# Patient Record
Sex: FEMALE | Race: BLACK OR AFRICAN AMERICAN | NOT HISPANIC OR LATINO
[De-identification: names, ages, dates, MRNs, and addresses within clinical notes are randomized per-mention and may not be internally consistent; named-entity substitution may affect disease eponyms.]

---

## 2017-04-05 ENCOUNTER — APPOINTMENT (OUTPATIENT)
Dept: FAMILY MEDICINE | Facility: CLINIC | Age: 27
End: 2017-04-05

## 2017-04-05 VITALS
HEIGHT: 61 IN | TEMPERATURE: 98.4 F | WEIGHT: 140 LBS | DIASTOLIC BLOOD PRESSURE: 70 MMHG | RESPIRATION RATE: 16 BRPM | OXYGEN SATURATION: 99 % | BODY MASS INDEX: 26.43 KG/M2 | HEART RATE: 70 BPM | SYSTOLIC BLOOD PRESSURE: 118 MMHG

## 2017-04-05 DIAGNOSIS — J06.9 ACUTE UPPER RESPIRATORY INFECTION, UNSPECIFIED: ICD-10-CM

## 2017-04-05 RX ORDER — METRONIDAZOLE 7.5 MG/G
0.75 GEL VAGINAL
Qty: 1 | Refills: 0 | Status: DISCONTINUED | COMMUNITY
Start: 2017-01-11 | End: 2017-04-05

## 2017-08-14 ENCOUNTER — CLINICAL ADVICE (OUTPATIENT)
Age: 27
End: 2017-08-14

## 2017-08-14 DIAGNOSIS — O26.851 SPOTTING COMPLICATING PREGNANCY, FIRST TRIMESTER: ICD-10-CM

## 2017-08-14 DIAGNOSIS — Z32.01 ENCOUNTER FOR PREGNANCY TEST, RESULT POSITIVE: ICD-10-CM

## 2017-08-17 ENCOUNTER — ASOB RESULT (OUTPATIENT)
Age: 27
End: 2017-08-17

## 2017-08-17 ENCOUNTER — APPOINTMENT (OUTPATIENT)
Dept: OBGYN | Facility: CLINIC | Age: 27
End: 2017-08-17
Payer: COMMERCIAL

## 2017-08-17 ENCOUNTER — APPOINTMENT (OUTPATIENT)
Dept: OBGYN | Facility: CLINIC | Age: 27
End: 2017-08-17

## 2017-08-17 VITALS
HEIGHT: 61 IN | BODY MASS INDEX: 23.03 KG/M2 | DIASTOLIC BLOOD PRESSURE: 82 MMHG | WEIGHT: 122 LBS | SYSTOLIC BLOOD PRESSURE: 145 MMHG | HEART RATE: 80 BPM

## 2017-08-17 DIAGNOSIS — Z34.90 ENCOUNTER FOR SUPERVISION OF NORMAL PREGNANCY, UNSPECIFIED, UNSPECIFIED TRIMESTER: ICD-10-CM

## 2017-08-17 PROCEDURE — 76817 TRANSVAGINAL US OBSTETRIC: CPT

## 2017-08-17 PROCEDURE — 99213 OFFICE O/P EST LOW 20 MIN: CPT

## 2017-08-17 RX ORDER — AMOXICILLIN 500 MG/1
500 CAPSULE ORAL TWICE DAILY
Qty: 14 | Refills: 0 | Status: DISCONTINUED | COMMUNITY
Start: 2017-04-05 | End: 2017-08-17

## 2017-08-18 ENCOUNTER — APPOINTMENT (OUTPATIENT)
Dept: OBGYN | Facility: CLINIC | Age: 27
End: 2017-08-18

## 2017-08-18 LAB
ALBUMIN SERPL ELPH-MCNC: 4.3 G/DL
ALP BLD-CCNC: 85 U/L
ALT SERPL-CCNC: 8 U/L
ANION GAP SERPL CALC-SCNC: 14 MMOL/L
AST SERPL-CCNC: 12 U/L
BACTERIA UR CULT: NORMAL
BASOPHILS # BLD AUTO: 0.01 K/UL
BASOPHILS NFR BLD AUTO: 0.2 %
BILIRUB SERPL-MCNC: 0.5 MG/DL
BUN SERPL-MCNC: 8 MG/DL
C TRACH RRNA SPEC QL NAA+PROBE: NORMAL
CALCIUM SERPL-MCNC: 9.5 MG/DL
CHLORIDE SERPL-SCNC: 104 MMOL/L
CO2 SERPL-SCNC: 24 MMOL/L
CREAT SERPL-MCNC: 0.64 MG/DL
EOSINOPHIL # BLD AUTO: 0.03 K/UL
EOSINOPHIL NFR BLD AUTO: 0.5 %
GLUCOSE SERPL-MCNC: 78 MG/DL
HCG SERPL-MCNC: 719 MIU/ML
HCT VFR BLD CALC: 36.7 %
HGB BLD-MCNC: 12.4 G/DL
IMM GRANULOCYTES NFR BLD AUTO: 0.2 %
LYMPHOCYTES # BLD AUTO: 1.55 K/UL
LYMPHOCYTES NFR BLD AUTO: 24.1 %
MAN DIFF?: NORMAL
MCHC RBC-ENTMCNC: 32.5 PG
MCHC RBC-ENTMCNC: 33.8 GM/DL
MCV RBC AUTO: 96.1 FL
MONOCYTES # BLD AUTO: 0.29 K/UL
MONOCYTES NFR BLD AUTO: 4.5 %
N GONORRHOEA RRNA SPEC QL NAA+PROBE: NORMAL
NEUTROPHILS # BLD AUTO: 4.54 K/UL
NEUTROPHILS NFR BLD AUTO: 70.5 %
PLATELET # BLD AUTO: 220 K/UL
POTASSIUM SERPL-SCNC: 3.9 MMOL/L
PROT SERPL-MCNC: 7.2 G/DL
RBC # BLD: 3.82 M/UL
RBC # FLD: 12.4 %
SODIUM SERPL-SCNC: 142 MMOL/L
SOURCE AMPLIFICATION: NORMAL
WBC # FLD AUTO: 6.43 K/UL

## 2017-08-21 ENCOUNTER — CLINICAL ADVICE (OUTPATIENT)
Age: 27
End: 2017-08-21

## 2017-08-21 ENCOUNTER — EMERGENCY (EMERGENCY)
Facility: HOSPITAL | Age: 27
LOS: 1 days | Discharge: HOME | End: 2017-08-21
Attending: EMERGENCY MEDICINE | Admitting: EMERGENCY MEDICINE
Payer: COMMERCIAL

## 2017-08-21 ENCOUNTER — APPOINTMENT (OUTPATIENT)
Dept: OBGYN | Facility: CLINIC | Age: 27
End: 2017-08-21

## 2017-08-21 VITALS
TEMPERATURE: 98 F | OXYGEN SATURATION: 100 % | HEART RATE: 63 BPM | SYSTOLIC BLOOD PRESSURE: 134 MMHG | DIASTOLIC BLOOD PRESSURE: 82 MMHG | RESPIRATION RATE: 18 BRPM

## 2017-08-21 LAB
ABO + RH PNL BLD: NORMAL
BLD GP AB SCN SERPL QL: NORMAL
HCG SERPL-MCNC: 1092 MIU/ML

## 2017-08-21 PROCEDURE — 76815 OB US LIMITED FETUS(S): CPT | Mod: 26

## 2017-08-21 PROCEDURE — 99244 OFF/OP CNSLTJ NEW/EST MOD 40: CPT

## 2017-08-21 PROCEDURE — 99285 EMERGENCY DEPT VISIT HI MDM: CPT | Mod: 25

## 2017-08-21 NOTE — ED ADULT TRIAGE NOTE - CHIEF COMPLAINT QUOTE
r/o ectopic    found out was pregnant 8/9 (+ucg)...went to [facility] spotting and cramping...returned to us.... had sono and no iup but hcg 363...had a rpt sono (no iup seen) and rpt hcg done...level increased to 719,,,, uses 1-2 pads a day, has cramping....

## 2017-08-22 VITALS
SYSTOLIC BLOOD PRESSURE: 132 MMHG | RESPIRATION RATE: 14 BRPM | HEART RATE: 60 BPM | DIASTOLIC BLOOD PRESSURE: 62 MMHG | OXYGEN SATURATION: 97 % | TEMPERATURE: 98 F

## 2017-08-22 DIAGNOSIS — O00.90 UNSPECIFIED ECTOPIC PREGNANCY WITHOUT INTRAUTERINE PREGNANCY: ICD-10-CM

## 2017-08-22 LAB
ALBUMIN SERPL ELPH-MCNC: 4.2 G/DL — SIGNIFICANT CHANGE UP (ref 3.3–5)
ALP SERPL-CCNC: 71 U/L — SIGNIFICANT CHANGE UP (ref 40–120)
ALT FLD-CCNC: 8 U/L — SIGNIFICANT CHANGE UP (ref 4–33)
APPEARANCE UR: SIGNIFICANT CHANGE UP
AST SERPL-CCNC: 13 U/L — SIGNIFICANT CHANGE UP (ref 4–32)
BASOPHILS # BLD AUTO: 0.02 K/UL — SIGNIFICANT CHANGE UP (ref 0–0.2)
BASOPHILS NFR BLD AUTO: 0.3 % — SIGNIFICANT CHANGE UP (ref 0–2)
BILIRUB SERPL-MCNC: 0.5 MG/DL — SIGNIFICANT CHANGE UP (ref 0.2–1.2)
BILIRUB UR-MCNC: NEGATIVE — SIGNIFICANT CHANGE UP
BLD GP AB SCN SERPL QL: NEGATIVE — SIGNIFICANT CHANGE UP
BLOOD UR QL VISUAL: HIGH
BUN SERPL-MCNC: 8 MG/DL — SIGNIFICANT CHANGE UP (ref 7–23)
CALCIUM SERPL-MCNC: 9.1 MG/DL — SIGNIFICANT CHANGE UP (ref 8.4–10.5)
CHLORIDE SERPL-SCNC: 106 MMOL/L — SIGNIFICANT CHANGE UP (ref 98–107)
CO2 SERPL-SCNC: 23 MMOL/L — SIGNIFICANT CHANGE UP (ref 22–31)
COLOR SPEC: YELLOW — SIGNIFICANT CHANGE UP
CREAT SERPL-MCNC: 0.56 MG/DL — SIGNIFICANT CHANGE UP (ref 0.5–1.3)
EOSINOPHIL # BLD AUTO: 0.04 K/UL — SIGNIFICANT CHANGE UP (ref 0–0.5)
EOSINOPHIL NFR BLD AUTO: 0.6 % — SIGNIFICANT CHANGE UP (ref 0–6)
GLUCOSE SERPL-MCNC: 84 MG/DL — SIGNIFICANT CHANGE UP (ref 70–99)
GLUCOSE UR-MCNC: NEGATIVE — SIGNIFICANT CHANGE UP
HCG SERPL-ACNC: 1376 MIU/ML — SIGNIFICANT CHANGE UP
HCT VFR BLD CALC: 31.5 % — LOW (ref 34.5–45)
HGB BLD-MCNC: 10.9 G/DL — LOW (ref 11.5–15.5)
IMM GRANULOCYTES # BLD AUTO: 0.01 # — SIGNIFICANT CHANGE UP
IMM GRANULOCYTES NFR BLD AUTO: 0.1 % — SIGNIFICANT CHANGE UP (ref 0–1.5)
KETONES UR-MCNC: NEGATIVE — SIGNIFICANT CHANGE UP
LEUKOCYTE ESTERASE UR-ACNC: NEGATIVE — SIGNIFICANT CHANGE UP
LYMPHOCYTES # BLD AUTO: 2.31 K/UL — SIGNIFICANT CHANGE UP (ref 1–3.3)
LYMPHOCYTES # BLD AUTO: 32.8 % — SIGNIFICANT CHANGE UP (ref 13–44)
MCHC RBC-ENTMCNC: 33 PG — SIGNIFICANT CHANGE UP (ref 27–34)
MCHC RBC-ENTMCNC: 34.6 % — SIGNIFICANT CHANGE UP (ref 32–36)
MCV RBC AUTO: 95.5 FL — SIGNIFICANT CHANGE UP (ref 80–100)
MONOCYTES # BLD AUTO: 0.34 K/UL — SIGNIFICANT CHANGE UP (ref 0–0.9)
MONOCYTES NFR BLD AUTO: 4.8 % — SIGNIFICANT CHANGE UP (ref 2–14)
MUCOUS THREADS # UR AUTO: SIGNIFICANT CHANGE UP
NEUTROPHILS # BLD AUTO: 4.33 K/UL — SIGNIFICANT CHANGE UP (ref 1.8–7.4)
NEUTROPHILS NFR BLD AUTO: 61.4 % — SIGNIFICANT CHANGE UP (ref 43–77)
NITRITE UR-MCNC: NEGATIVE — SIGNIFICANT CHANGE UP
NRBC # FLD: 0 — SIGNIFICANT CHANGE UP
PH UR: 5.5 — SIGNIFICANT CHANGE UP (ref 4.6–8)
PLATELET # BLD AUTO: 188 K/UL — SIGNIFICANT CHANGE UP (ref 150–400)
PMV BLD: 11 FL — SIGNIFICANT CHANGE UP (ref 7–13)
POTASSIUM SERPL-MCNC: 3.7 MMOL/L — SIGNIFICANT CHANGE UP (ref 3.5–5.3)
POTASSIUM SERPL-SCNC: 3.7 MMOL/L — SIGNIFICANT CHANGE UP (ref 3.5–5.3)
PROT SERPL-MCNC: 6.4 G/DL — SIGNIFICANT CHANGE UP (ref 6–8.3)
PROT UR-MCNC: 20 — SIGNIFICANT CHANGE UP
RBC # BLD: 3.3 M/UL — LOW (ref 3.8–5.2)
RBC # FLD: 11.7 % — SIGNIFICANT CHANGE UP (ref 10.3–14.5)
RBC CASTS # UR COMP ASSIST: HIGH (ref 0–?)
RH IG SCN BLD-IMP: POSITIVE — SIGNIFICANT CHANGE UP
SODIUM SERPL-SCNC: 144 MMOL/L — SIGNIFICANT CHANGE UP (ref 135–145)
SP GR SPEC: 1.02 — SIGNIFICANT CHANGE UP (ref 1–1.03)
SQUAMOUS # UR AUTO: SIGNIFICANT CHANGE UP
UROBILINOGEN FLD QL: NORMAL E.U. — SIGNIFICANT CHANGE UP (ref 0.1–0.2)
WBC # BLD: 7.05 K/UL — SIGNIFICANT CHANGE UP (ref 3.8–10.5)
WBC # FLD AUTO: 7.05 K/UL — SIGNIFICANT CHANGE UP (ref 3.8–10.5)
WBC UR QL: SIGNIFICANT CHANGE UP (ref 0–?)

## 2017-08-22 PROCEDURE — 76830 TRANSVAGINAL US NON-OB: CPT | Mod: 26

## 2017-08-22 RX ORDER — FENTANYL CITRATE 50 UG/ML
50 INJECTION INTRAVENOUS ONCE
Qty: 0 | Refills: 0 | Status: DISCONTINUED | OUTPATIENT
Start: 2017-08-22 | End: 2017-08-22

## 2017-08-22 RX ORDER — ONDANSETRON 8 MG/1
4 TABLET, FILM COATED ORAL ONCE
Qty: 0 | Refills: 0 | Status: DISCONTINUED | OUTPATIENT
Start: 2017-08-22 | End: 2017-08-22

## 2017-08-22 RX ORDER — METHOTREXATE 2.5 MG/1
76.5 TABLET ORAL ONCE
Qty: 0 | Refills: 0 | Status: COMPLETED | OUTPATIENT
Start: 2017-08-22 | End: 2017-08-22

## 2017-08-22 RX ORDER — SODIUM CHLORIDE 9 MG/ML
1000 INJECTION INTRAMUSCULAR; INTRAVENOUS; SUBCUTANEOUS ONCE
Qty: 0 | Refills: 0 | Status: COMPLETED | OUTPATIENT
Start: 2017-08-22 | End: 2017-08-22

## 2017-08-22 RX ADMIN — SODIUM CHLORIDE 1000 MILLILITER(S): 9 INJECTION INTRAMUSCULAR; INTRAVENOUS; SUBCUTANEOUS at 02:39

## 2017-08-22 RX ADMIN — METHOTREXATE 76.5 MILLIGRAM(S): 2.5 TABLET ORAL at 07:03

## 2017-08-22 NOTE — ED PROVIDER NOTE - CARE PLAN
Principal Discharge DX:	Ectopic pregnancy  Goal:	Follow up with OB/GYN  Instructions for follow-up, activity and diet:	Please follow up with your OB/GYN. If you experience worsening pain or bleeding, please seek medical attention immediately.

## 2017-08-22 NOTE — ED PROVIDER NOTE - CHIEF COMPLAINT
The patient is a 27y Female complaining of The patient is a 27y Female complaining of abdominal cramping and vaginal bleeding

## 2017-08-22 NOTE — ED PROVIDER NOTE - MEDICAL DECISION MAKING DETAILS
Patient is a 26 yo F w/ first pregnancy with cramping and vaginal bleeding x2 weeks. HCG trending upward and no IUP seen on prior TVUS. Concern for ectopic pregnancy. Will check UA, CBC, coags, type and screen, TVUS. Patient is a 26 yo F w/ first pregnancy with cramping and vaginal bleeding x2 weeks. HCG trending upward and no IUP seen on prior TVUS. Concern for ectopic pregnancy. Will check UA, CBC, coags, type and screen, TVUS.    TVUS suggestive of ectopic pregnancy. GYN will administer MTX and then DC home - Fady Hou PGY 3

## 2017-08-22 NOTE — CONSULT NOTE ADULT - SUBJECTIVE AND OBJECTIVE BOX
HPI:    27y  @ 7 weeks 0/7days by LMP 2017  presents with increased vaginal bleeding. Patient has been followed by Dr. Leonard to trend beta HCG secondary to vaginal bleeding and abdominal pain. Patient reports that her abdominal pain has resolved. Denies chest pain, SOB, N/V, leg pain.    Betas:       OBHx:topx3  GynHx: regular menses, hx of fibroid. Hx of chlamydia infection  PMH:denies  PSH:D&Cx1  All: shrimp, NKDA  Meds: none      Vital Signs Last 24 Hrs  T(C): 36.9 (22 Aug 2017 01:00), Max: 36.9 (21 Aug 2017 23:14)  T(F): 98.4 (22 Aug 2017 01:00), Max: 98.4 (21 Aug 2017 23:14)  HR: 71 (22 Aug 2017 02:59) (60 - 71)  BP: 128/70 (22 Aug 2017 02:59) (99/53 - 134/82)  BP(mean): --  RR: 15 (22 Aug 2017 02:59) (14 - 18)  SpO2: 98% (22 Aug 2017 02:59) (98% - 100%)    PE:  Gen: Comfortable, NAD  CV: RRR  Pulm: CTAB  Abd: Soft, NT nondistended  Ext: No edema or tenderness bilaterally  Spec Exam: small amount of dark red blood in the vagina. No active bleeding. No cervical or vaginal lesions.     LABS:                        10.9   7.05  )-----------( 188      ( 22 Aug 2017 00:50 )             31.5         144  |  106  |  8   ----------------------------<  84  3.7   |  23  |  0.56    Ca    9.1      22 Aug 2017 00:50    TPro  6.4  /  Alb  4.2  /  TBili  0.5  /  DBili  x   /  AST  13  /  ALT  8   /  AlkPhos  71  08-22      Urinalysis Basic - ( 22 Aug 2017 03:00 )    Color: YELLOW / Appearance: HAZY / S.024 / pH: 5.5  Gluc: NEGATIVE / Ketone: NEGATIVE  / Bili: NEGATIVE / Urobili: NORMAL E.U.   Blood: MODERATE / Protein: 20 / Nitrite: NEGATIVE   Leuk Esterase: NEGATIVE / RBC: 5-10 / WBC 2-5   Sq Epi: MOD / Non Sq Epi: x / Bacteria: x        RADIOLOGY & ADDITIONAL STUDIES:      EXAM:  US TRANSVAGINAL        PROCEDURE DATE:  Aug 22 2017         INTERPRETATION:  CLINICAL INFORMATION: Vaginal bleeding for 9 days. Serum   hCG is 1376    LMP: 2017  Estimated Gestational Age by LMP: 7 weeks 0 days.    COMPARISON:None available.    TECHNIQUE: Transabominal pelvic sonogram.     FINDINGS:    Uterus: There is a fundal fibroid measuring 4.7 x 5.2 x 5.9 cm. The   endometrium is thickened measuring up to 10 mm. No gestational sac is   demonstrated.     Right ovary: 4.1 x 2.4 x 4.0 cm. Within normal limits.  Left ovary: 3.4 x 1.8 x 3.0 cm with a thick-walled paraovarian cystic   lesion measuring 1.1 x 0.9 cm.  Free fluid: None.    IMPRESSION:    Thick-walled cystic lesion in the left paraovarian region which given the   lack of an intrauterine gestation is suspicious for ectopic pregnancy   unless proven otherwise. Correlate with serial beta hCGs and follow-up   pelvic ultrasound as clinically warranted. HPI:    27y  @ 7 weeks 0/7days by LMP 2017  presents with increased vaginal bleeding. Patient has been followed by Dr. Leonard to trend beta HCG secondary to vaginal bleeding and abdominal pain. Patient reports that her abdominal pain has resolved. Denies chest pain, SOB, N/V, leg pain.        OBHx:topx3  GynHx: regular menses, hx of fibroid. Hx of chlamydia infection  PMH:denies  PSH:D&Cx1  All: shrimp, NKDA  Meds: none      Vital Signs Last 24 Hrs  T(C): 36.9 (22 Aug 2017 01:00), Max: 36.9 (21 Aug 2017 23:14)  T(F): 98.4 (22 Aug 2017 01:00), Max: 98.4 (21 Aug 2017 23:14)  HR: 71 (22 Aug 2017 02:59) (60 - 71)  BP: 128/70 (22 Aug 2017 02:59) (99/53 - 134/82)  BP(mean): --  RR: 15 (22 Aug 2017 02:59) (14 - 18)  SpO2: 98% (22 Aug 2017 02:59) (98% - 100%)    PE:  Gen: Comfortable, NAD  CV: RRR  Pulm: CTAB  Abd: Soft, NT nondistended  Ext: No edema or tenderness bilaterally  Spec Exam: small amount of dark red blood in the vagina. No active bleeding. No cervical or vaginal lesions.     LABS:                        10.9   7.05  )-----------( 188      ( 22 Aug 2017 00:50 )             31.5         144  |  106  |  8   ----------------------------<  84  3.7   |  23  |  0.56    Ca    9.1      22 Aug 2017 00:50    TPro  6.4  /  Alb  4.2  /  TBili  0.5  /  DBili  x   /  AST  13  /  ALT  8   /  AlkPhos  71  08-22      Urinalysis Basic - ( 22 Aug 2017 03:00 )    Color: YELLOW / Appearance: HAZY / S.024 / pH: 5.5  Gluc: NEGATIVE / Ketone: NEGATIVE  / Bili: NEGATIVE / Urobili: NORMAL E.U.   Blood: MODERATE / Protein: 20 / Nitrite: NEGATIVE   Leuk Esterase: NEGATIVE / RBC: 5-10 / WBC 2-5   Sq Epi: MOD / Non Sq Epi: x / Bacteria: x        RADIOLOGY & ADDITIONAL STUDIES:      EXAM:  US TRANSVAGINAL        PROCEDURE DATE:  Aug 22 2017         INTERPRETATION:  CLINICAL INFORMATION: Vaginal bleeding for 9 days. Serum   hCG is 1376    LMP: 2017  Estimated Gestational Age by LMP: 7 weeks 0 days.    COMPARISON:None available.    TECHNIQUE: Transabominal pelvic sonogram.     FINDINGS:    Uterus: There is a fundal fibroid measuring 4.7 x 5.2 x 5.9 cm. The   endometrium is thickened measuring up to 10 mm. No gestational sac is   demonstrated.     Right ovary: 4.1 x 2.4 x 4.0 cm. Within normal limits.  Left ovary: 3.4 x 1.8 x 3.0 cm with a thick-walled paraovarian cystic   lesion measuring 1.1 x 0.9 cm.  Free fluid: None.    IMPRESSION:    Thick-walled cystic lesion in the left paraovarian region which given the   lack of an intrauterine gestation is suspicious for ectopic pregnancy   unless proven otherwise. Correlate with serial beta hCGs and follow-up   pelvic ultrasound as clinically warranted.

## 2017-08-22 NOTE — ED PROVIDER NOTE - PLAN OF CARE
Follow up with OB/GYN Please follow up with your OB/GYN. If you experience worsening pain or bleeding, please seek medical attention immediately.

## 2017-08-22 NOTE — ED PROVIDER NOTE - ATTENDING CONTRIBUTION TO CARE
I was physically present for the E/M service provided. I agree with above history, physical, and plan which I have reviewed and edited where appropriate. I was physically present for the key portions of the service provided.    27F  LNMP . Positive home pregnancy test . Vaginal bleeding 1 pad per day. Has followed with Gyn.  last US, no IUP, fibroids. Up-trending beta-hcg. LLQ pain is improving. Sent by Gyn to r/o ectopic.  -NAD, abdomen soft NTND  -TV US r/o ectopic  -Beta-Hcg + T&S check  -UA r/o UTI

## 2017-08-22 NOTE — ED ADULT NURSE NOTE - OBJECTIVE STATEMENT
Pt received to room 17 A&Ox3 c/o abdominal pain with vaginal spotting x a week. Reports + hcg that have been increasing. Sent to ED by OBGYN to r/o ectopic pregnancy. IV accessed, labs sent, vital signs noted. Will continue monitor.

## 2017-08-22 NOTE — CONSULT NOTE ADULT - ATTENDING COMMENTS
High suspicion for left ectopic pregnancy.  No contraindications for MTX.  R/A/B of MTX d/w pt and her family.  MTX ordered

## 2017-08-22 NOTE — CONSULT NOTE ADULT - ASSESSMENT
27y  @ 7 weeks 0/7days by LMP 2017  presents with increased vaginal bleeding and cystic structure next to the left ovary suspicious for an ectopic pregnancy

## 2017-08-22 NOTE — CONSULT NOTE ADULT - PROBLEM SELECTOR RECOMMENDATION 9
- counseled patient on medical and surgical management   - patient to receive methotrexate  - tbs with Dr. Leonard    d/w Dr. Horacio Choudhury PGY-2 - counseled patient on medical and surgical management   - patient to receive methotrexate 76.5 MG IM once  - f/u [facility] friday 8/25 and monday 8/28 with Dr. Leonard    Patient seen with Dr. Horacio Choudhury PGY-2

## 2017-08-22 NOTE — ED PROVIDER NOTE - OBJECTIVE STATEMENT
Patient is a 28 yo  F w/ no PMH p/w abdominal cramping and vaginal bleeding x2 weeks. Patient found out she was pregnant with positive urine HCG on . Since then, patient has had lower abdominal cramping and vaginal spotting. Cramping initially 8/10 now 6/10. Vaginal spotting progressed to heavier bleeding. Serum HCG trending upwards in PMD/outpatient GYN office, 363 to 719 to 1092. Patient had TVUS last  which showed fibroid, no evidence for IUP and ? finding in L fallopian tube. Also endorses increased urinary frequency and nausea.. Denies any fevers, chills, vomiting.

## 2017-08-22 NOTE — ED PROVIDER NOTE - PROGRESS NOTE DETAILS
Huber COSTA: received signout on patient. Pt received MTX for ectopic pregnancy. Seen by OB here in ED. Has follow up appointment for Friday. On reassessment pt feeling okay 30 mins s/p MTX. Repeat vitals reassuring. Will dc home with close follow up and return precautions.

## 2017-08-22 NOTE — ED PROCEDURE NOTE - PROCEDURE ADDITIONAL DETAILS
focused Ed ultrasound transabdominal OB to evaluate fetal well being.   uterus scanned in two planes in gray scale and m mode  No IUP in uterus  No free fluid    Impression: No IUP. No free fluid in pelvis.  ELIAS  [id number]

## 2017-08-24 ENCOUNTER — APPOINTMENT (OUTPATIENT)
Dept: OBGYN | Facility: CLINIC | Age: 27
End: 2017-08-24

## 2017-08-25 ENCOUNTER — APPOINTMENT (OUTPATIENT)
Dept: OBGYN | Facility: CLINIC | Age: 27
End: 2017-08-25

## 2017-08-25 LAB — HCG SERPL-MCNC: 2427 MIU/ML

## 2017-08-28 ENCOUNTER — APPOINTMENT (OUTPATIENT)
Dept: OBGYN | Facility: CLINIC | Age: 27
End: 2017-08-28

## 2017-08-28 ENCOUNTER — OTHER (OUTPATIENT)
Age: 27
End: 2017-08-28

## 2017-08-28 ENCOUNTER — EMERGENCY (EMERGENCY)
Facility: HOSPITAL | Age: 27
LOS: 1 days | Discharge: HOME | End: 2017-08-28
Admitting: EMERGENCY MEDICINE
Payer: COMMERCIAL

## 2017-08-28 VITALS
DIASTOLIC BLOOD PRESSURE: 56 MMHG | SYSTOLIC BLOOD PRESSURE: 125 MMHG | RESPIRATION RATE: 16 BRPM | HEART RATE: 59 BPM | OXYGEN SATURATION: 100 %

## 2017-08-28 VITALS
OXYGEN SATURATION: 100 % | DIASTOLIC BLOOD PRESSURE: 59 MMHG | RESPIRATION RATE: 18 BRPM | TEMPERATURE: 98 F | HEART RATE: 76 BPM | SYSTOLIC BLOOD PRESSURE: 119 MMHG

## 2017-08-28 DIAGNOSIS — O00.10 TUBAL PREGNANCY WITHOUT INTRAUTERINE PREGNANCY: ICD-10-CM

## 2017-08-28 LAB
ALBUMIN SERPL ELPH-MCNC: 4 G/DL — SIGNIFICANT CHANGE UP (ref 3.3–5)
ALP SERPL-CCNC: 69 U/L — SIGNIFICANT CHANGE UP (ref 40–120)
ALT FLD-CCNC: 15 U/L — SIGNIFICANT CHANGE UP (ref 4–33)
APPEARANCE UR: CLEAR — SIGNIFICANT CHANGE UP
AST SERPL-CCNC: 16 U/L — SIGNIFICANT CHANGE UP (ref 4–32)
BASOPHILS # BLD AUTO: 0.01 K/UL — SIGNIFICANT CHANGE UP (ref 0–0.2)
BASOPHILS NFR BLD AUTO: 0.2 % — SIGNIFICANT CHANGE UP (ref 0–2)
BILIRUB SERPL-MCNC: 0.3 MG/DL — SIGNIFICANT CHANGE UP (ref 0.2–1.2)
BILIRUB UR-MCNC: NEGATIVE — SIGNIFICANT CHANGE UP
BLD GP AB SCN SERPL QL: NEGATIVE — SIGNIFICANT CHANGE UP
BLOOD UR QL VISUAL: NEGATIVE — SIGNIFICANT CHANGE UP
BUN SERPL-MCNC: 7 MG/DL — SIGNIFICANT CHANGE UP (ref 7–23)
CALCIUM SERPL-MCNC: 8.9 MG/DL — SIGNIFICANT CHANGE UP (ref 8.4–10.5)
CHLORIDE SERPL-SCNC: 104 MMOL/L — SIGNIFICANT CHANGE UP (ref 98–107)
CO2 SERPL-SCNC: 26 MMOL/L — SIGNIFICANT CHANGE UP (ref 22–31)
COLOR SPEC: YELLOW — SIGNIFICANT CHANGE UP
CREAT SERPL-MCNC: 0.58 MG/DL — SIGNIFICANT CHANGE UP (ref 0.5–1.3)
EOSINOPHIL # BLD AUTO: 0.05 K/UL — SIGNIFICANT CHANGE UP (ref 0–0.5)
EOSINOPHIL NFR BLD AUTO: 1.1 % — SIGNIFICANT CHANGE UP (ref 0–6)
GLUCOSE SERPL-MCNC: 85 MG/DL — SIGNIFICANT CHANGE UP (ref 70–99)
GLUCOSE UR-MCNC: NEGATIVE — SIGNIFICANT CHANGE UP
HCT VFR BLD CALC: 31.2 % — LOW (ref 34.5–45)
HGB BLD-MCNC: 10.6 G/DL — LOW (ref 11.5–15.5)
IMM GRANULOCYTES # BLD AUTO: 0.03 # — SIGNIFICANT CHANGE UP
IMM GRANULOCYTES NFR BLD AUTO: 0.6 % — SIGNIFICANT CHANGE UP (ref 0–1.5)
KETONES UR-MCNC: NEGATIVE — SIGNIFICANT CHANGE UP
LEUKOCYTE ESTERASE UR-ACNC: NEGATIVE — SIGNIFICANT CHANGE UP
LYMPHOCYTES # BLD AUTO: 1.4 K/UL — SIGNIFICANT CHANGE UP (ref 1–3.3)
LYMPHOCYTES # BLD AUTO: 29.9 % — SIGNIFICANT CHANGE UP (ref 13–44)
MCHC RBC-ENTMCNC: 32.6 PG — SIGNIFICANT CHANGE UP (ref 27–34)
MCHC RBC-ENTMCNC: 34 % — SIGNIFICANT CHANGE UP (ref 32–36)
MCV RBC AUTO: 96 FL — SIGNIFICANT CHANGE UP (ref 80–100)
MONOCYTES # BLD AUTO: 0.27 K/UL — SIGNIFICANT CHANGE UP (ref 0–0.9)
MONOCYTES NFR BLD AUTO: 5.8 % — SIGNIFICANT CHANGE UP (ref 2–14)
MUCOUS THREADS # UR AUTO: SIGNIFICANT CHANGE UP
NEUTROPHILS # BLD AUTO: 2.92 K/UL — SIGNIFICANT CHANGE UP (ref 1.8–7.4)
NEUTROPHILS NFR BLD AUTO: 62.4 % — SIGNIFICANT CHANGE UP (ref 43–77)
NITRITE UR-MCNC: NEGATIVE — SIGNIFICANT CHANGE UP
NRBC # FLD: 0 — SIGNIFICANT CHANGE UP
PH UR: 8.5 — HIGH (ref 4.6–8)
PLATELET # BLD AUTO: 148 K/UL — LOW (ref 150–400)
PMV BLD: 10.8 FL — SIGNIFICANT CHANGE UP (ref 7–13)
POTASSIUM SERPL-MCNC: 4.2 MMOL/L — SIGNIFICANT CHANGE UP (ref 3.5–5.3)
POTASSIUM SERPL-SCNC: 4.2 MMOL/L — SIGNIFICANT CHANGE UP (ref 3.5–5.3)
PROT SERPL-MCNC: 6.6 G/DL — SIGNIFICANT CHANGE UP (ref 6–8.3)
PROT UR-MCNC: 10 — SIGNIFICANT CHANGE UP
RBC # BLD: 3.25 M/UL — LOW (ref 3.8–5.2)
RBC # FLD: 11.2 % — SIGNIFICANT CHANGE UP (ref 10.3–14.5)
RBC CASTS # UR COMP ASSIST: SIGNIFICANT CHANGE UP (ref 0–?)
RH IG SCN BLD-IMP: POSITIVE — SIGNIFICANT CHANGE UP
SODIUM SERPL-SCNC: 141 MMOL/L — SIGNIFICANT CHANGE UP (ref 135–145)
SP GR SPEC: 1.01 — SIGNIFICANT CHANGE UP (ref 1–1.03)
SQUAMOUS # UR AUTO: SIGNIFICANT CHANGE UP
UROBILINOGEN FLD QL: NORMAL E.U. — SIGNIFICANT CHANGE UP (ref 0.1–0.2)
WBC # BLD: 4.68 K/UL — SIGNIFICANT CHANGE UP (ref 3.8–10.5)
WBC # FLD AUTO: 4.68 K/UL — SIGNIFICANT CHANGE UP (ref 3.8–10.5)
WBC UR QL: SIGNIFICANT CHANGE UP (ref 0–?)

## 2017-08-28 PROCEDURE — 99285 EMERGENCY DEPT VISIT HI MDM: CPT

## 2017-08-28 PROCEDURE — 76830 TRANSVAGINAL US NON-OB: CPT | Mod: 26

## 2017-08-28 RX ORDER — METHOTREXATE 2.5 MG/1
80 TABLET ORAL ONCE
Qty: 0 | Refills: 0 | Status: COMPLETED | OUTPATIENT
Start: 2017-08-28 | End: 2017-08-28

## 2017-08-28 RX ORDER — SODIUM CHLORIDE 9 MG/ML
1000 INJECTION INTRAMUSCULAR; INTRAVENOUS; SUBCUTANEOUS ONCE
Qty: 0 | Refills: 0 | Status: COMPLETED | OUTPATIENT
Start: 2017-08-28 | End: 2017-08-28

## 2017-08-28 RX ADMIN — SODIUM CHLORIDE 1000 MILLILITER(S): 9 INJECTION INTRAMUSCULAR; INTRAVENOUS; SUBCUTANEOUS at 16:11

## 2017-08-28 RX ADMIN — METHOTREXATE 80 MILLIGRAM(S): 2.5 TABLET ORAL at 20:06

## 2017-08-28 NOTE — ED PROVIDER NOTE - OBJECTIVE STATEMENT
28 yo female  c known left ectopic pregnancy s/p methotrexate on 17 sent in by Dr. Leonard for elevated HCG levels today. Pt states does not have any bleeding or cramping at present.  As per patient Dr. Leonard wanted her to get a repeat US and methotrexate dose.

## 2017-08-28 NOTE — ED ADULT NURSE NOTE - OBJECTIVE STATEMENT
26 yo female  c known left ectopic pregnancy s/p methotrexate on 17 sent in by Dr. Leonard for elevated HCG levels today. Pt states does not have any bleeding or cramping at present.  As per patient Dr. Leonard wanted her to get a repeat US and methotrexate dose.

## 2017-08-28 NOTE — ED PROVIDER NOTE - PROGRESS NOTE DETAILS
Pt evaluated by GYN- US revealed unchanged ectopic pregnancy from last week. Received MTX, will dc to follow up with Dr. Leonard on 08/31 for repeat beta levels.

## 2017-08-28 NOTE — CONSULT NOTE ADULT - ATTENDING COMMENTS
Patient was seen and examined by me. Agree with resident assessment and plan. Ectopic pregnancy, s/p 1 dose of MTX without drop in bhCG. As patient is stable and asymptomatic, without concern for rupture of ectopic pregnancy, would recommend second dose of methotrexate. Patient was counseled regarding risks, benefits, and alternatives. All questions answered.

## 2017-08-28 NOTE — CONSULT NOTE ADULT - PROBLEM SELECTOR RECOMMENDATION 9
- 2nd dose of MTX to be given today given pt's stable condition and absence of pain  - Pt will follow up on 8/31 and 9/3 for day 4 and day 7 [facility] test    d/w Vanessa Hernandez, Horacio, & Fran

## 2017-08-28 NOTE — CONSULT NOTE ADULT - ASSESSMENT
27y  LMP 2017 with left ectopic pregnancy s/p MTX on  w/ bHCG values that did not decrease appropriately, otherwise stable with no change in ectopic appearance.

## 2017-08-28 NOTE — ED PROVIDER NOTE - CARE PLAN
Principal Discharge DX:	Tubal pregnancy without intrauterine pregnancy  Instructions for follow-up, activity and diet:	Follow up with Dr. Leonard on 08/31 for repeat beta check. Drink plenty of fluids. Return to ED immediately if you have any worsening pain or bleeding.

## 2017-08-28 NOTE — ED PROVIDER NOTE - MEDICAL DECISION MAKING DETAILS
28 yo female c known left ectopic pregnancy s/p methotrexate on 08/21 sent in for rising beta level.  Sent by Dr. mathews for repeat US and methotrexate. Pt has no pain or bleeding, in NAD. Will call GYN order labs and US.

## 2017-08-28 NOTE — CONSULT NOTE ADULT - SUBJECTIVE AND OBJECTIVE BOX
27y  LMP 2017 presents to ED for f/u of ectopic pregnancy. Patient was originally being followed by Dr. Leonard to trend beta HCG secondary to vaginal bleeding and abdominal pain, but her symptoms have since resolved. She received MTX on  for an ectopic pregnancy seen on the left. Her bHCG since trended from 1376 () to 2427 () to 2614 () (Values can be found in [facility] tab of sunrise). Pt denies chest pain, SOB, N/V, fevers, and chills  OBHx:topx3  GynHx: regular menses, hx of fibroid. Hx of chlamydia infection  PMH: denies  PSH: D&Cx1  All: shrimp, NKDA  Meds: none    VS  T(C): 36.8 (17 @ 15:18)  HR: 76 (17 @ 15:18)  BP: 119/59 (17 @ 15:18)  RR: 18 (17 @ 15:18)  SpO2: 100% (17 @ 15:18)    Physical Exam:  General: NAD  Abdomen: Soft, non-tender, non-distended  Pelvic: Labia wnl w/o lesions or erythema, no discharge or bleeding in vaginal vault, no cervical discharge or erythema, no CMT, uterus not enlarged, adnexa w/o masses and tenderness               10.6   4.68  )-----------( 148      (  @ 16:10 )             31.2      @ 16:10    141  |  104  |  7   ----------------------------<  85  4.2   |  26  |  0.58    Ca    8.9       @ 16:10    TPro  6.6  /  Alb  4.0  /  TBili  0.3  /  DBili  x   /  AST  16  /  ALT  15  /  AlkPhos  69   @ 16:10    Blood Type: AB Positive  Antibody Screen: Negative    < from: US Transvaginal (17 @ 16:49) >  EXAM:  US TRANSVAGINAL        PROCEDURE DATE:  Aug 28 2017         INTERPRETATION:  CLINICAL INFORMATION:        LMP:        COMPARISON: Ultrasound 2017    TECHNIQUE:     Transvaginal pelvic sonogram.            FINDINGS:    Uterus: No intrauterine gestation. Posterior subserosal leiomyoma 4.5 cm.   Endometrium: 3 mm. Within normal limits.    Right ovary: Within normal limits.    Left ovary: 1.4 x 1.1 x 1 cm ectopic gestational sac essentially   unchanged. Corpus luteum again visualized.    Fluid: None.    IMPRESSION:    Left ectopic gestation essentially unchanged from 2017    < end of copied text >

## 2017-08-28 NOTE — ED ADULT TRIAGE NOTE - CHIEF COMPLAINT QUOTE
Pt was treated for ectopic pregnancy las monday. Pt was given methotrexate but states has cramping and HCG levels still elevated. Pt sent in by gyn.

## 2017-08-29 DIAGNOSIS — O00.90 UNSPECIFIED. ECTOPIC. PREGNANCY WITHOUT INTRAUTERINE PREGNANCY: ICD-10-CM

## 2017-08-29 LAB — HCG SERPL-MCNC: 2614 MIU/ML

## 2017-08-31 ENCOUNTER — APPOINTMENT (OUTPATIENT)
Dept: OBGYN | Facility: CLINIC | Age: 27
End: 2017-08-31

## 2017-08-31 ENCOUNTER — OTHER (OUTPATIENT)
Age: 27
End: 2017-08-31

## 2017-08-31 LAB — HCG SERPL-MCNC: 2307 MIU/ML

## 2017-09-04 LAB — HCG SERPL-MCNC: 1735 MIU/ML

## 2017-09-13 ENCOUNTER — APPOINTMENT (OUTPATIENT)
Dept: OBGYN | Facility: CLINIC | Age: 27
End: 2017-09-13

## 2017-09-26 LAB — HCG SERPL-MCNC: 874 MIU/ML

## 2017-09-29 LAB — HCG SERPL-MCNC: 2 MIU/ML

## 2018-03-01 ENCOUNTER — APPOINTMENT (OUTPATIENT)
Dept: OBGYN | Facility: CLINIC | Age: 28
End: 2018-03-01

## 2018-03-02 ENCOUNTER — LABORATORY RESULT (OUTPATIENT)
Age: 28
End: 2018-03-02

## 2018-03-02 ENCOUNTER — APPOINTMENT (OUTPATIENT)
Dept: OBGYN | Facility: CLINIC | Age: 28
End: 2018-03-02
Payer: COMMERCIAL

## 2018-03-02 VITALS
WEIGHT: 130 LBS | SYSTOLIC BLOOD PRESSURE: 133 MMHG | HEIGHT: 61 IN | HEART RATE: 67 BPM | DIASTOLIC BLOOD PRESSURE: 82 MMHG | BODY MASS INDEX: 24.55 KG/M2

## 2018-03-02 DIAGNOSIS — N76.0 ACUTE VAGINITIS: ICD-10-CM

## 2018-03-02 DIAGNOSIS — B96.89 ACUTE VAGINITIS: ICD-10-CM

## 2018-03-02 PROCEDURE — 99213 OFFICE O/P EST LOW 20 MIN: CPT

## 2018-03-05 LAB
C TRACH RRNA SPEC QL NAA+PROBE: NOT DETECTED
CANDIDA VAG CYTO: NOT DETECTED
G VAGINALIS+PREV SP MTYP VAG QL MICRO: DETECTED
N GONORRHOEA RRNA SPEC QL NAA+PROBE: NOT DETECTED
SOURCE AMPLIFICATION: NORMAL
T VAGINALIS VAG QL WET PREP: NOT DETECTED

## 2018-03-08 LAB — CYTOLOGY CVX/VAG DOC THIN PREP: NORMAL

## 2018-03-20 ENCOUNTER — EMERGENCY (EMERGENCY)
Facility: HOSPITAL | Age: 28
LOS: 1 days | Discharge: HOME | End: 2018-03-20
Attending: EMERGENCY MEDICINE | Admitting: EMERGENCY MEDICINE
Payer: COMMERCIAL

## 2018-03-20 VITALS
RESPIRATION RATE: 18 BRPM | DIASTOLIC BLOOD PRESSURE: 73 MMHG | OXYGEN SATURATION: 99 % | TEMPERATURE: 98 F | HEART RATE: 48 BPM | SYSTOLIC BLOOD PRESSURE: 128 MMHG

## 2018-03-20 VITALS
OXYGEN SATURATION: 98 % | DIASTOLIC BLOOD PRESSURE: 70 MMHG | HEART RATE: 72 BPM | RESPIRATION RATE: 16 BRPM | TEMPERATURE: 99 F | SYSTOLIC BLOOD PRESSURE: 120 MMHG

## 2018-03-20 PROCEDURE — 99282 EMERGENCY DEPT VISIT SF MDM: CPT

## 2018-03-20 PROCEDURE — 99283 EMERGENCY DEPT VISIT LOW MDM: CPT

## 2018-03-20 NOTE — ED PROVIDER NOTE - MEDICAL DECISION MAKING DETAILS
Chris: low risk scalp injury with no LOC no vomiting not on blood thinner. Discussed CT with patient risks and likelihood of injury. Shared discussion making pt ok with no CT. Will give neuro referral and pain control.

## 2018-03-20 NOTE — ED ADULT TRIAGE NOTE - CHIEF COMPLAINT QUOTE
consistent H/A x3 days, hit head on fridge and has been having pain since, denies changes in vision, positive photosensitivity

## 2018-03-20 NOTE — ED ADULT NURSE NOTE - OBJECTIVE STATEMENT
Patient reports she accidentally hit her head on the handle of her refrigerator one week ago. Patient reports feeling lightheaded while getting up, a throbbing pressure to the left side of head and some neck tightness. Also patient reports bilateral eye pain, but no visual changes and no double vision. Patient denies loss of consciousness and denies nausea and/or vomiting.  Patient has had some relief with Advil at home.  Patient is AOx3, PERRL, moves all extremities and ambulatory without assistance. Lungs clear to auscultation bilaterally, no chest pain, no shortness of breath. No abdominal pain. + bowel sounds in all four quadrants. Skin warm, dry, and intact. MD Perez at bedside. Patient oriented to room safety and callbell within reach. Patient reports she accidentally hit her head on the handle of her refrigerator one week ago. Patient reports feeling lightheaded while getting up, a throbbing pressure to the left side of head and some neck tightness. Also patient reports bilateral eye pain, but no visual changes and no double vision. Patient denies loss of consciousness and denies nausea and/or vomiting.  Patient has had some relief with Advil at home. Patient heart rate on pulse ox high 40s low 50s. MD Perez aware and comfortable with discharge.  Patient is AOx3, PERRL, moves all extremities and ambulatory without assistance. Lungs clear to auscultation bilaterally, no chest pain, no shortness of breath. No abdominal pain. + bowel sounds in all four quadrants. Skin warm, dry, and intact. MD Perez at bedside. Patient oriented to room safety and callbell within reach.

## 2018-03-20 NOTE — ED PROVIDER NOTE - CHPI ED SYMPTOMS NEG
no syncope/no loss of consciousness/no change in level of consciousness/no vomiting/no weakness/no blurred vision

## 2018-03-20 NOTE — ED PROVIDER NOTE - OBJECTIVE STATEMENT
Patient over a week ago was rough housing and struck head on wall. No LOC, no pain immediately. no vomitting.  The next day there was some scalp tenderness. Later that week there was an episode where pt stood up and fel momentarily dizzy. (once) Patient in last 2 days has developed headache which is whole head and tenseness in rt neck. No neuro changes, nl phone use. Nl memory. pt does not feel "right" Pmhx ectopic. not on blood thinners or other meds.

## 2018-04-24 ENCOUNTER — APPOINTMENT (OUTPATIENT)
Dept: OBGYN | Facility: CLINIC | Age: 28
End: 2018-04-24

## 2018-05-01 ENCOUNTER — APPOINTMENT (OUTPATIENT)
Dept: OBGYN | Facility: CLINIC | Age: 28
End: 2018-05-01
Payer: COMMERCIAL

## 2018-05-01 VITALS
HEIGHT: 61 IN | SYSTOLIC BLOOD PRESSURE: 122 MMHG | WEIGHT: 129.5 LBS | BODY MASS INDEX: 24.45 KG/M2 | DIASTOLIC BLOOD PRESSURE: 79 MMHG | HEART RATE: 66 BPM

## 2018-05-01 DIAGNOSIS — N64.4 MASTODYNIA: ICD-10-CM

## 2018-05-01 PROCEDURE — 99213 OFFICE O/P EST LOW 20 MIN: CPT

## 2018-05-04 RX ORDER — METRONIDAZOLE 7.5 MG/G
0.75 GEL VAGINAL
Qty: 1 | Refills: 1 | Status: DISCONTINUED | COMMUNITY
Start: 2018-03-02 | End: 2018-05-04

## 2018-05-04 RX ORDER — NAPROXEN 500 MG/1
500 TABLET ORAL
Qty: 20 | Refills: 0 | Status: ACTIVE | COMMUNITY
Start: 2018-03-20

## 2018-05-09 ENCOUNTER — APPOINTMENT (OUTPATIENT)
Dept: ULTRASOUND IMAGING | Facility: HOSPITAL | Age: 28
End: 2018-05-09
Payer: COMMERCIAL

## 2018-05-09 ENCOUNTER — OUTPATIENT (OUTPATIENT)
Dept: OUTPATIENT SERVICES | Facility: HOSPITAL | Age: 28
LOS: 1 days | End: 2018-05-09
Payer: COMMERCIAL

## 2018-05-09 DIAGNOSIS — N64.4 MASTODYNIA: ICD-10-CM

## 2018-05-09 PROCEDURE — 76641 ULTRASOUND BREAST COMPLETE: CPT

## 2018-05-09 PROCEDURE — 76641 ULTRASOUND BREAST COMPLETE: CPT | Mod: 26

## 2018-05-15 ENCOUNTER — ASOB RESULT (OUTPATIENT)
Age: 28
End: 2018-05-15

## 2018-05-15 ENCOUNTER — APPOINTMENT (OUTPATIENT)
Dept: OBGYN | Facility: CLINIC | Age: 28
End: 2018-05-15
Payer: COMMERCIAL

## 2018-05-15 PROCEDURE — 76817 TRANSVAGINAL US OBSTETRIC: CPT

## 2018-05-30 NOTE — ED PROVIDER NOTE - ENMT, MLM
Airway patent, Nasal mucosa clear. Mouth with normal mucosa. Throat has no vesicles, no oropharyngeal exudates and uvula is midline.
I personally performed the service described in the documentation recorded by the scribe in my presence, and it accurately and completely records my words and actions.

## 2018-06-01 ENCOUNTER — EMERGENCY (EMERGENCY)
Facility: HOSPITAL | Age: 28
LOS: 1 days | End: 2018-06-01
Attending: EMERGENCY MEDICINE
Payer: COMMERCIAL

## 2018-06-01 VITALS
HEART RATE: 80 BPM | SYSTOLIC BLOOD PRESSURE: 123 MMHG | OXYGEN SATURATION: 100 % | WEIGHT: 126.99 LBS | RESPIRATION RATE: 16 BRPM | DIASTOLIC BLOOD PRESSURE: 72 MMHG | TEMPERATURE: 99 F

## 2018-06-01 PROCEDURE — 99284 EMERGENCY DEPT VISIT MOD MDM: CPT | Mod: 25

## 2018-06-01 NOTE — ED PROVIDER NOTE - PROGRESS NOTE DETAILS
Shashi Wilson MD: patient stable, no acute distress, rh+  No immediate life threatening issues present on history or clinical exam. Patient is a safe disposition home, has capacity and insight into their condition, is ambulatory in the Emergency Department and will follow up with their doctor(s) this week. Patient and family  understand anticipatory guidance were given strict return and follow up precautions.  The patient and family have been informed of all concerning signs and symptoms to return to Emergency Department, the necessity to follow up with the PMD/Clinic/follow up provided within 2-3 days was explained, and the patient and/or family reports understanding of above with capacity and insight. Transabdom u/s demonstrated gestational sac, yolk sac, normal FHR.

## 2018-06-01 NOTE — ED PROVIDER NOTE - OBJECTIVE STATEMENT
29 y/o F,  w/ 3 abortions and 1 ectopic, LMP 4/10, 7 wks and 4 days pregnant, confirmed IUP, p/w vaginal bleeding x 1 day, no clots, small amount of blood, no pain, no cough, fever, no dysuria.  OB: Melissa Ibarra ([place])

## 2018-06-01 NOTE — ED PROVIDER NOTE - ATTENDING CONTRIBUTION TO CARE
I have seen and evaluated this patient with the resident.   I agree with the findings  unless other wise stated.  I have made appropriate changes in documentations where needed, After my face to face bedside evaluation, I am further  noting:  Pt with spontaneous 1st trimester vaginal bleeding, no urinary symptoms no fever Abdomen soft Os closed, will check type, HCG, u/s. --Sharpe

## 2018-06-01 NOTE — ED ADULT NURSE NOTE - OBJECTIVE STATEMENT
28F c/o vaginal bleeding for one day, pt states "it was a large amount."  Pt states she is 7 weeks 4 days pregnant. Pt denies pain, denies change in urination, denies change in BM, denies chest pain/SOB/Fever/dizzy/lightheaded. Pt states she had a visit with her OB earlier this week and was told that everything was fine. Pt has 20Ga to L AC. Pt is Alert&Oriented X3, calm/cooperative, VSS, NAD at this time.

## 2018-06-01 NOTE — ED PROVIDER NOTE - PHYSICAL EXAMINATION
Gen: NAD  Eyes:  sclerae white, no icterus  ENT: Moist mucous membranes. No exudates  Neck: supple, no LAD, mass or goiter, trachea midline  CV: RRR. Audible S1 and S2. No murmurs, rubs, gallops, S3, nor S4  Pulm: Clear to auscultation bilaterally. No wheezes, rales, or rhonchi  Abd: BS+, nondistended, No tenderness to palpation  Musculoskeletal:  No edema  Skin: no lesions or scars noted  Psych: mood good, affect full range and congruent with mood.  Neurologic: AAOx3 Gen: NAD  Eyes:  sclerae white, no icterus  ENT: Moist mucous membranes. No exudates  Neck: supple, no LAD, mass or goiter, trachea midline  CV: RRR. Audible S1 and S2. No murmurs, rubs, gallops, S3, nor S4  Pulm: Clear to auscultation bilaterally. No wheezes, rales, or rhonchi  Abd: BS+, nondistended, No tenderness to palpation  : (Chaperone tech, Jesa): closed Os, blood in vaginal vault  Musculoskeletal:  No edema  Skin: no lesions or scars noted  Psych: mood good, affect full range and congruent with mood.  Neurologic: AAOx3

## 2018-06-02 VITALS
RESPIRATION RATE: 16 BRPM | TEMPERATURE: 99 F | HEART RATE: 67 BPM | OXYGEN SATURATION: 98 % | SYSTOLIC BLOOD PRESSURE: 111 MMHG | DIASTOLIC BLOOD PRESSURE: 71 MMHG

## 2018-06-02 LAB
ANION GAP SERPL CALC-SCNC: 9 MMOL/L — SIGNIFICANT CHANGE UP (ref 5–17)
APPEARANCE UR: CLEAR — SIGNIFICANT CHANGE UP
BASOPHILS # BLD AUTO: 0 K/UL — SIGNIFICANT CHANGE UP (ref 0–0.2)
BASOPHILS NFR BLD AUTO: 0.1 % — SIGNIFICANT CHANGE UP (ref 0–2)
BILIRUB UR-MCNC: NEGATIVE — SIGNIFICANT CHANGE UP
BLD GP AB SCN SERPL QL: NEGATIVE — SIGNIFICANT CHANGE UP
BUN SERPL-MCNC: 9 MG/DL — SIGNIFICANT CHANGE UP (ref 7–23)
CALCIUM SERPL-MCNC: 9 MG/DL — SIGNIFICANT CHANGE UP (ref 8.4–10.5)
CHLORIDE SERPL-SCNC: 105 MMOL/L — SIGNIFICANT CHANGE UP (ref 96–108)
CO2 SERPL-SCNC: 25 MMOL/L — SIGNIFICANT CHANGE UP (ref 22–31)
COLOR SPEC: YELLOW — SIGNIFICANT CHANGE UP
COMMENT - URINE: SIGNIFICANT CHANGE UP
CREAT SERPL-MCNC: 0.54 MG/DL — SIGNIFICANT CHANGE UP (ref 0.5–1.3)
DIFF PNL FLD: ABNORMAL
EOSINOPHIL # BLD AUTO: 0.1 K/UL — SIGNIFICANT CHANGE UP (ref 0–0.5)
EOSINOPHIL NFR BLD AUTO: 0.8 % — SIGNIFICANT CHANGE UP (ref 0–6)
EPI CELLS # UR: SIGNIFICANT CHANGE UP /HPF
GLUCOSE SERPL-MCNC: 111 MG/DL — HIGH (ref 70–99)
GLUCOSE UR QL: NEGATIVE — SIGNIFICANT CHANGE UP
HCG SERPL-ACNC: HIGH MIU/ML (ref 5–24)
HCT VFR BLD CALC: 33.2 % — LOW (ref 34.5–45)
HGB BLD-MCNC: 11.4 G/DL — LOW (ref 11.5–15.5)
KETONES UR-MCNC: ABNORMAL
LEUKOCYTE ESTERASE UR-ACNC: ABNORMAL
LYMPHOCYTES # BLD AUTO: 1.9 K/UL — SIGNIFICANT CHANGE UP (ref 1–3.3)
LYMPHOCYTES # BLD AUTO: 19.6 % — SIGNIFICANT CHANGE UP (ref 13–44)
MCHC RBC-ENTMCNC: 33.7 PG — SIGNIFICANT CHANGE UP (ref 27–34)
MCHC RBC-ENTMCNC: 34.4 GM/DL — SIGNIFICANT CHANGE UP (ref 32–36)
MCV RBC AUTO: 97.9 FL — SIGNIFICANT CHANGE UP (ref 80–100)
MONOCYTES # BLD AUTO: 0.4 K/UL — SIGNIFICANT CHANGE UP (ref 0–0.9)
MONOCYTES NFR BLD AUTO: 4.2 % — SIGNIFICANT CHANGE UP (ref 2–14)
NEUTROPHILS # BLD AUTO: 7.5 K/UL — HIGH (ref 1.8–7.4)
NEUTROPHILS NFR BLD AUTO: 75.3 % — SIGNIFICANT CHANGE UP (ref 43–77)
NITRITE UR-MCNC: NEGATIVE — SIGNIFICANT CHANGE UP
PH UR: 6 — SIGNIFICANT CHANGE UP (ref 5–8)
PLATELET # BLD AUTO: 208 K/UL — SIGNIFICANT CHANGE UP (ref 150–400)
POTASSIUM SERPL-MCNC: 3.6 MMOL/L — SIGNIFICANT CHANGE UP (ref 3.5–5.3)
POTASSIUM SERPL-SCNC: 3.6 MMOL/L — SIGNIFICANT CHANGE UP (ref 3.5–5.3)
PROT UR-MCNC: 30 MG/DL
RBC # BLD: 3.39 M/UL — LOW (ref 3.8–5.2)
RBC # FLD: 11.8 % — SIGNIFICANT CHANGE UP (ref 10.3–14.5)
RBC CASTS # UR COMP ASSIST: ABNORMAL /HPF (ref 0–2)
RH IG SCN BLD-IMP: POSITIVE — SIGNIFICANT CHANGE UP
RH IG SCN BLD-IMP: POSITIVE — SIGNIFICANT CHANGE UP
SODIUM SERPL-SCNC: 139 MMOL/L — SIGNIFICANT CHANGE UP (ref 135–145)
SP GR SPEC: 1.03 — HIGH (ref 1.01–1.02)
UROBILINOGEN FLD QL: 1 MG/DL
WBC # BLD: 9.9 K/UL — SIGNIFICANT CHANGE UP (ref 3.8–10.5)
WBC # FLD AUTO: 9.9 K/UL — SIGNIFICANT CHANGE UP (ref 3.8–10.5)
WBC UR QL: SIGNIFICANT CHANGE UP /HPF (ref 0–5)

## 2018-06-02 PROCEDURE — 99284 EMERGENCY DEPT VISIT MOD MDM: CPT

## 2018-06-02 PROCEDURE — 84702 CHORIONIC GONADOTROPIN TEST: CPT

## 2018-06-02 PROCEDURE — 80048 BASIC METABOLIC PNL TOTAL CA: CPT

## 2018-06-02 PROCEDURE — 86900 BLOOD TYPING SEROLOGIC ABO: CPT

## 2018-06-02 PROCEDURE — 86850 RBC ANTIBODY SCREEN: CPT

## 2018-06-02 PROCEDURE — 86901 BLOOD TYPING SEROLOGIC RH(D): CPT

## 2018-06-02 PROCEDURE — 76815 OB US LIMITED FETUS(S): CPT

## 2018-06-02 PROCEDURE — 81001 URINALYSIS AUTO W/SCOPE: CPT

## 2018-06-02 PROCEDURE — 87086 URINE CULTURE/COLONY COUNT: CPT

## 2018-06-02 PROCEDURE — 85027 COMPLETE CBC AUTOMATED: CPT

## 2018-06-02 PROCEDURE — 87186 SC STD MICRODIL/AGAR DIL: CPT

## 2018-06-03 PROCEDURE — 76815 OB US LIMITED FETUS(S): CPT | Mod: 26

## 2018-06-06 RX ORDER — CEPHALEXIN 500 MG
1 CAPSULE ORAL
Qty: 15 | Refills: 0 | OUTPATIENT
Start: 2018-06-06 | End: 2018-06-10

## 2018-07-09 ENCOUNTER — EMERGENCY (EMERGENCY)
Facility: HOSPITAL | Age: 28
LOS: 1 days | Discharge: HOME | End: 2018-07-09
Attending: EMERGENCY MEDICINE | Admitting: EMERGENCY MEDICINE
Payer: COMMERCIAL

## 2018-07-09 VITALS
DIASTOLIC BLOOD PRESSURE: 69 MMHG | OXYGEN SATURATION: 100 % | RESPIRATION RATE: 16 BRPM | TEMPERATURE: 98 F | SYSTOLIC BLOOD PRESSURE: 127 MMHG | HEART RATE: 70 BPM

## 2018-07-09 VITALS
OXYGEN SATURATION: 100 % | HEART RATE: 82 BPM | DIASTOLIC BLOOD PRESSURE: 69 MMHG | RESPIRATION RATE: 18 BRPM | TEMPERATURE: 99 F | SYSTOLIC BLOOD PRESSURE: 112 MMHG

## 2018-07-09 LAB
APPEARANCE UR: SIGNIFICANT CHANGE UP
BACTERIA # UR AUTO: SIGNIFICANT CHANGE UP
BILIRUB UR-MCNC: NEGATIVE — SIGNIFICANT CHANGE UP
BLOOD UR QL VISUAL: NEGATIVE — SIGNIFICANT CHANGE UP
COLOR SPEC: YELLOW — SIGNIFICANT CHANGE UP
GLUCOSE UR-MCNC: NEGATIVE — SIGNIFICANT CHANGE UP
KETONES UR-MCNC: SIGNIFICANT CHANGE UP
LEUKOCYTE ESTERASE UR-ACNC: SIGNIFICANT CHANGE UP
MUCOUS THREADS # UR AUTO: SIGNIFICANT CHANGE UP
NITRITE UR-MCNC: NEGATIVE — SIGNIFICANT CHANGE UP
PH UR: 6 — SIGNIFICANT CHANGE UP (ref 4.6–8)
PROT UR-MCNC: 20 MG/DL — SIGNIFICANT CHANGE UP
RBC CASTS # UR COMP ASSIST: SIGNIFICANT CHANGE UP (ref 0–?)
SP GR SPEC: 1.02 — SIGNIFICANT CHANGE UP (ref 1–1.04)
SQUAMOUS # UR AUTO: SIGNIFICANT CHANGE UP
UROBILINOGEN FLD QL: NORMAL MG/DL — SIGNIFICANT CHANGE UP
WBC UR QL: HIGH (ref 0–?)

## 2018-07-09 PROCEDURE — 76801 OB US < 14 WKS SINGLE FETUS: CPT | Mod: 26

## 2018-07-09 PROCEDURE — 99284 EMERGENCY DEPT VISIT MOD MDM: CPT

## 2018-07-09 RX ADMIN — Medication 1 APPLICATORFUL: at 16:36

## 2018-07-09 NOTE — ED ADULT TRIAGE NOTE - CHIEF COMPLAINT QUOTE
13 wks pregnant. reports clear .yellowish discharge vaginal x 1 this am,similar discharge x 2 over past wk. reports mild cramping  " not sure if it is my ligamensts stretching" denies bleeding,

## 2018-07-09 NOTE — ED PROVIDER NOTE - OBJECTIVE STATEMENT
29 y/o F,  w/ 3 abortions and 1 ectopic, LMP 4/10, 7 wks and 4 days pregnant, confirmed IUP, p/w vaginal discharge, non bloody. states feels thick, white discharge with no burning, dysuria, polyuria. states has not been sexually active for over 1 week with persistent drainage. no ulcers, vescicles. admits to muld suprapubic "pressure" with no pain. no cramping, no back pain, sexually active with 1 partner, no contraception.

## 2018-07-10 LAB
C TRACH RRNA SPEC QL NAA+PROBE: SIGNIFICANT CHANGE UP
N GONORRHOEA RRNA SPEC QL NAA+PROBE: SIGNIFICANT CHANGE UP
SPECIMEN SOURCE: SIGNIFICANT CHANGE UP
SPECIMEN SOURCE: SIGNIFICANT CHANGE UP

## 2018-07-11 LAB — BACTERIA UR CULT: SIGNIFICANT CHANGE UP

## 2018-07-13 ENCOUNTER — ASOB RESULT (OUTPATIENT)
Age: 28
End: 2018-07-13

## 2018-07-13 ENCOUNTER — APPOINTMENT (OUTPATIENT)
Dept: ANTEPARTUM | Facility: CLINIC | Age: 28
End: 2018-07-13
Payer: COMMERCIAL

## 2018-07-13 PROCEDURE — 76801 OB US < 14 WKS SINGLE FETUS: CPT

## 2018-07-13 PROCEDURE — 76813 OB US NUCHAL MEAS 1 GEST: CPT

## 2018-07-13 PROCEDURE — 36416 COLLJ CAPILLARY BLOOD SPEC: CPT

## 2018-07-19 ENCOUNTER — APPOINTMENT (OUTPATIENT)
Dept: ANTEPARTUM | Facility: CLINIC | Age: 28
End: 2018-07-19

## 2018-07-27 PROBLEM — Z34.90 PREGNANCY, LOCATION UNKNOWN: Status: ACTIVE | Noted: 2017-08-17

## 2018-08-28 ENCOUNTER — APPOINTMENT (OUTPATIENT)
Dept: ANTEPARTUM | Facility: CLINIC | Age: 28
End: 2018-08-28

## 2018-08-29 ENCOUNTER — APPOINTMENT (OUTPATIENT)
Dept: ANTEPARTUM | Facility: CLINIC | Age: 28
End: 2018-08-29

## 2019-08-26 PROBLEM — O00.90 UNSPECIFIED ECTOPIC PREGNANCY WITHOUT INTRAUTERINE PREGNANCY: Chronic | Status: ACTIVE | Noted: 2018-03-20

## 2019-09-03 ENCOUNTER — APPOINTMENT (OUTPATIENT)
Dept: ULTRASOUND IMAGING | Facility: HOSPITAL | Age: 29
End: 2019-09-03
Payer: SELF-PAY

## 2019-09-03 ENCOUNTER — APPOINTMENT (OUTPATIENT)
Dept: MAMMOGRAPHY | Facility: HOSPITAL | Age: 29
End: 2019-09-03
Payer: SELF-PAY

## 2019-09-03 ENCOUNTER — OUTPATIENT (OUTPATIENT)
Dept: OUTPATIENT SERVICES | Facility: HOSPITAL | Age: 29
LOS: 1 days | End: 2019-09-03
Payer: SELF-PAY

## 2019-09-03 DIAGNOSIS — Z00.8 ENCOUNTER FOR OTHER GENERAL EXAMINATION: ICD-10-CM

## 2019-09-03 PROCEDURE — 76641 ULTRASOUND BREAST COMPLETE: CPT

## 2019-09-03 PROCEDURE — 76641 ULTRASOUND BREAST COMPLETE: CPT | Mod: 26,50

## 2020-12-15 PROBLEM — J06.9 ACUTE URI: Status: RESOLVED | Noted: 2017-04-05 | Resolved: 2020-12-15

## 2021-01-05 ENCOUNTER — EMERGENCY (EMERGENCY)
Facility: HOSPITAL | Age: 31
LOS: 1 days | Discharge: HOME | End: 2021-01-05
Attending: EMERGENCY MEDICINE
Payer: MEDICAID

## 2021-01-05 VITALS
HEART RATE: 77 BPM | OXYGEN SATURATION: 99 % | RESPIRATION RATE: 18 BRPM | SYSTOLIC BLOOD PRESSURE: 126 MMHG | DIASTOLIC BLOOD PRESSURE: 80 MMHG | TEMPERATURE: 98 F

## 2021-01-05 VITALS
HEIGHT: 61 IN | RESPIRATION RATE: 18 BRPM | TEMPERATURE: 98 F | OXYGEN SATURATION: 98 % | HEART RATE: 80 BPM | DIASTOLIC BLOOD PRESSURE: 85 MMHG | WEIGHT: 138.89 LBS | SYSTOLIC BLOOD PRESSURE: 127 MMHG

## 2021-01-05 LAB
ALBUMIN SERPL ELPH-MCNC: 4.1 G/DL — SIGNIFICANT CHANGE UP (ref 3.3–5)
ALP SERPL-CCNC: 82 U/L — SIGNIFICANT CHANGE UP (ref 40–120)
ALT FLD-CCNC: 10 U/L — SIGNIFICANT CHANGE UP (ref 10–45)
ANION GAP SERPL CALC-SCNC: 13 MMOL/L — SIGNIFICANT CHANGE UP (ref 5–17)
APPEARANCE UR: ABNORMAL
AST SERPL-CCNC: 12 U/L — SIGNIFICANT CHANGE UP (ref 10–40)
BACTERIA # UR AUTO: ABNORMAL
BASOPHILS # BLD AUTO: 0.02 K/UL — SIGNIFICANT CHANGE UP (ref 0–0.2)
BASOPHILS NFR BLD AUTO: 0.2 % — SIGNIFICANT CHANGE UP (ref 0–2)
BILIRUB SERPL-MCNC: 0.5 MG/DL — SIGNIFICANT CHANGE UP (ref 0.2–1.2)
BILIRUB UR-MCNC: NEGATIVE — SIGNIFICANT CHANGE UP
BLD GP AB SCN SERPL QL: NEGATIVE — SIGNIFICANT CHANGE UP
BUN SERPL-MCNC: 10 MG/DL — SIGNIFICANT CHANGE UP (ref 7–23)
CALCIUM SERPL-MCNC: 9 MG/DL — SIGNIFICANT CHANGE UP (ref 8.4–10.5)
CHLORIDE SERPL-SCNC: 105 MMOL/L — SIGNIFICANT CHANGE UP (ref 96–108)
CO2 SERPL-SCNC: 22 MMOL/L — SIGNIFICANT CHANGE UP (ref 22–31)
COLOR SPEC: YELLOW — SIGNIFICANT CHANGE UP
CREAT SERPL-MCNC: 0.53 MG/DL — SIGNIFICANT CHANGE UP (ref 0.5–1.3)
DIFF PNL FLD: ABNORMAL
EOSINOPHIL # BLD AUTO: 0.03 K/UL — SIGNIFICANT CHANGE UP (ref 0–0.5)
EOSINOPHIL NFR BLD AUTO: 0.4 % — SIGNIFICANT CHANGE UP (ref 0–6)
EPI CELLS # UR: 3 /HPF — SIGNIFICANT CHANGE UP
GLUCOSE SERPL-MCNC: 85 MG/DL — SIGNIFICANT CHANGE UP (ref 70–99)
GLUCOSE UR QL: NEGATIVE — SIGNIFICANT CHANGE UP
HCG SERPL-ACNC: HIGH MIU/ML
HCT VFR BLD CALC: 33.5 % — LOW (ref 34.5–45)
HGB BLD-MCNC: 11.3 G/DL — LOW (ref 11.5–15.5)
HYALINE CASTS # UR AUTO: 1 /LPF — SIGNIFICANT CHANGE UP (ref 0–2)
IMM GRANULOCYTES NFR BLD AUTO: 0.4 % — SIGNIFICANT CHANGE UP (ref 0–1.5)
KETONES UR-MCNC: NEGATIVE — SIGNIFICANT CHANGE UP
LEUKOCYTE ESTERASE UR-ACNC: NEGATIVE — SIGNIFICANT CHANGE UP
LYMPHOCYTES # BLD AUTO: 1.24 K/UL — SIGNIFICANT CHANGE UP (ref 1–3.3)
LYMPHOCYTES # BLD AUTO: 15.5 % — SIGNIFICANT CHANGE UP (ref 13–44)
MCHC RBC-ENTMCNC: 32.6 PG — SIGNIFICANT CHANGE UP (ref 27–34)
MCHC RBC-ENTMCNC: 33.7 GM/DL — SIGNIFICANT CHANGE UP (ref 32–36)
MCV RBC AUTO: 96.5 FL — SIGNIFICANT CHANGE UP (ref 80–100)
MONOCYTES # BLD AUTO: 0.46 K/UL — SIGNIFICANT CHANGE UP (ref 0–0.9)
MONOCYTES NFR BLD AUTO: 5.7 % — SIGNIFICANT CHANGE UP (ref 2–14)
NEUTROPHILS # BLD AUTO: 6.24 K/UL — SIGNIFICANT CHANGE UP (ref 1.8–7.4)
NEUTROPHILS NFR BLD AUTO: 77.8 % — HIGH (ref 43–77)
NITRITE UR-MCNC: NEGATIVE — SIGNIFICANT CHANGE UP
NRBC # BLD: 0 /100 WBCS — SIGNIFICANT CHANGE UP (ref 0–0)
PH UR: 6.5 — SIGNIFICANT CHANGE UP (ref 5–8)
PLATELET # BLD AUTO: 227 K/UL — SIGNIFICANT CHANGE UP (ref 150–400)
POTASSIUM SERPL-MCNC: 3.7 MMOL/L — SIGNIFICANT CHANGE UP (ref 3.5–5.3)
POTASSIUM SERPL-SCNC: 3.7 MMOL/L — SIGNIFICANT CHANGE UP (ref 3.5–5.3)
PROT SERPL-MCNC: 6.7 G/DL — SIGNIFICANT CHANGE UP (ref 6–8.3)
PROT UR-MCNC: ABNORMAL
RBC # BLD: 3.47 M/UL — LOW (ref 3.8–5.2)
RBC # FLD: 12.7 % — SIGNIFICANT CHANGE UP (ref 10.3–14.5)
RBC CASTS # UR COMP ASSIST: 3 /HPF — SIGNIFICANT CHANGE UP (ref 0–4)
RH IG SCN BLD-IMP: POSITIVE — SIGNIFICANT CHANGE UP
SODIUM SERPL-SCNC: 140 MMOL/L — SIGNIFICANT CHANGE UP (ref 135–145)
SP GR SPEC: 1.02 — SIGNIFICANT CHANGE UP (ref 1.01–1.02)
UROBILINOGEN FLD QL: NEGATIVE — SIGNIFICANT CHANGE UP
WBC # BLD: 8.02 K/UL — SIGNIFICANT CHANGE UP (ref 3.8–10.5)
WBC # FLD AUTO: 8.02 K/UL — SIGNIFICANT CHANGE UP (ref 3.8–10.5)
WBC UR QL: 1 /HPF — SIGNIFICANT CHANGE UP (ref 0–5)

## 2021-01-05 PROCEDURE — 76817 TRANSVAGINAL US OBSTETRIC: CPT | Mod: 26

## 2021-01-05 PROCEDURE — 99285 EMERGENCY DEPT VISIT HI MDM: CPT

## 2021-01-05 PROCEDURE — 87086 URINE CULTURE/COLONY COUNT: CPT

## 2021-01-05 PROCEDURE — 85025 COMPLETE CBC W/AUTO DIFF WBC: CPT

## 2021-01-05 PROCEDURE — 86850 RBC ANTIBODY SCREEN: CPT

## 2021-01-05 PROCEDURE — 84702 CHORIONIC GONADOTROPIN TEST: CPT

## 2021-01-05 PROCEDURE — 86900 BLOOD TYPING SEROLOGIC ABO: CPT

## 2021-01-05 PROCEDURE — 86901 BLOOD TYPING SEROLOGIC RH(D): CPT

## 2021-01-05 PROCEDURE — 76817 TRANSVAGINAL US OBSTETRIC: CPT

## 2021-01-05 PROCEDURE — 81001 URINALYSIS AUTO W/SCOPE: CPT

## 2021-01-05 PROCEDURE — 80053 COMPREHEN METABOLIC PANEL: CPT

## 2021-01-05 PROCEDURE — 99284 EMERGENCY DEPT VISIT MOD MDM: CPT

## 2021-01-05 NOTE — ED ADULT NURSE NOTE - OBJECTIVE STATEMENT
pt tested positive for pregnancy 2 weeks ago.  she has been bleeding since yesterday and has a history of miscarriage and ectopic pregnancy.

## 2021-01-05 NOTE — ED PROVIDER NOTE - NSFOLLOWUPINSTRUCTIONS_ED_ALL_ED_FT
Rest and stay well hydrated.   Please follow up with your OBGYN upon discharge.   Return for any new abdominal pain, back pain, new/worsening vaginal bleeding, passing out, chest pain, shortness of breath, or any other concerns.         ------------------------------------------------------        Vaginal Bleeding During Pregnancy, First Trimester       A small amount of bleeding from the vagina (spotting) is relatively common during early pregnancy. It usually stops on its own. Various things may cause bleeding or spotting during early pregnancy. Some bleeding may be related to the pregnancy, and some may not. In many cases, the bleeding is normal and is not a problem. However, bleeding can also be a sign of something serious. Be sure to tell your health care provider about any vaginal bleeding right away.  Some possible causes of vaginal bleeding during the first trimester include:  •Infection or inflammation of the cervix.      •Growths (polyps) on the cervix.      •Miscarriage or threatened miscarriage.      •Pregnancy tissue developing outside of the uterus (ectopic pregnancy).      •A mass of tissue developing in the uterus due to an egg being fertilized incorrectly (molar pregnancy).        Follow these instructions at home:    Activity     •Follow instructions from your health care provider about limiting your activity. Ask what activities are safe for you.      •If needed, make plans for someone to help with your regular activities.      • Do not have sex or orgasms until your health care provider says that this is safe.      General instructions     •Take over-the-counter and prescription medicines only as told by your health care provider.      •Pay attention to any changes in your symptoms.      • Do not use tampons or douche.      •Write down how many pads you use each day, how often you change pads, and how soaked (saturated) they are.      •If you pass any tissue from your vagina, save the tissue so you can show it to your health care provider.      •Keep all follow-up visits as told by your health care provider. This is important.        Contact a health care provider if:    •You have vaginal bleeding during any part of your pregnancy.      •You have cramps or labor pains.      •You have a fever.        Get help right away if:    •You have severe cramps in your back or abdomen.      •You pass large clots or a large amount of tissue from your vagina.      •Your bleeding increases.      •You feel light-headed or weak, or you faint.      •You have chills.      •You are leaking fluid or have a gush of fluid from your vagina.        Summary    •A small amount of bleeding (spotting) from the vagina is relatively common during early pregnancy.      •Various things may cause bleeding or spotting in early pregnancy.      •Be sure to tell your health care provider about any vaginal bleeding right away.      This information is not intended to replace advice given to you by your health care provider. Make sure you discuss any questions you have with your health care provider. Rest and stay well hydrated.   Please follow up with your OBGYN upon discharge.   Bring copy of results with you.   Return to ER for any new abdominal pain, back pain, new/worsening vaginal bleeding, passing out, chest pain, shortness of breath, or any other concerns.             ------------------------------------------------------        Vaginal Bleeding During Pregnancy, First Trimester       A small amount of bleeding from the vagina (spotting) is relatively common during early pregnancy. It usually stops on its own. Various things may cause bleeding or spotting during early pregnancy. Some bleeding may be related to the pregnancy, and some may not. In many cases, the bleeding is normal and is not a problem. However, bleeding can also be a sign of something serious. Be sure to tell your health care provider about any vaginal bleeding right away.  Some possible causes of vaginal bleeding during the first trimester include:  •Infection or inflammation of the cervix.      •Growths (polyps) on the cervix.      •Miscarriage or threatened miscarriage.      •Pregnancy tissue developing outside of the uterus (ectopic pregnancy).      •A mass of tissue developing in the uterus due to an egg being fertilized incorrectly (molar pregnancy).        Follow these instructions at home:    Activity     •Follow instructions from your health care provider about limiting your activity. Ask what activities are safe for you.      •If needed, make plans for someone to help with your regular activities.      • Do not have sex or orgasms until your health care provider says that this is safe.      General instructions     •Take over-the-counter and prescription medicines only as told by your health care provider.      •Pay attention to any changes in your symptoms.      • Do not use tampons or douche.      •Write down how many pads you use each day, how often you change pads, and how soaked (saturated) they are.      •If you pass any tissue from your vagina, save the tissue so you can show it to your health care provider.      •Keep all follow-up visits as told by your health care provider. This is important.        Contact a health care provider if:    •You have vaginal bleeding during any part of your pregnancy.      •You have cramps or labor pains.      •You have a fever.        Get help right away if:    •You have severe cramps in your back or abdomen.      •You pass large clots or a large amount of tissue from your vagina.      •Your bleeding increases.      •You feel light-headed or weak, or you faint.      •You have chills.      •You are leaking fluid or have a gush of fluid from your vagina.        Summary    •A small amount of bleeding (spotting) from the vagina is relatively common during early pregnancy.      •Various things may cause bleeding or spotting in early pregnancy.      •Be sure to tell your health care provider about any vaginal bleeding right away.      This information is not intended to replace advice given to you by your health care provider. Make sure you discuss any questions you have with your health care provider.

## 2021-01-05 NOTE — ED PROVIDER NOTE - OBJECTIVE STATEMENT
30 year old F A5 at [id number] pregnant by LMP 20 (not IUP confirmed) with pmhx of ectopic pregnancy in 2017 treated with MTX and uterine fibroid presents to ED c/o worsening vaginal bleeding. Pt reports her LMP was on 20 and she took a home pregnancy test 2 weeks ago that resulted positive. She endorses intermittent vaginal spotting x1 week ago that worsened to steady vaginal bleeding last night. She reports saturating 2 panty liners since yesterday. She reports abdominal "fullness" but denies abd pain. She also endorses some generalized lightheadedness and fatigue. Pt with hx of miscarriage at 14 weeks in 2018. She also reports having 3 abortions in the past. Denies back pain, dysuria,  abd pain, nausea, vomiting diarrhea, leg swelling, CP, SOB, and visual changes. +hx of STI: chlamydia in the past. Sexually active with one male partner, edison. OBGYN: Dr. Alvarado Guaman. No daily meds. NKDA. 30 year old F  at [id number] by LMP 20 (not IUP confirmed) with pmhx of ectopic pregnancy in 2017 treated with MTX and uterine fibroid presents to ED c/o worsening vaginal bleeding. Pt reports her LMP was on 20 and she took a home pregnancy test 2 weeks ago that resulted positive. She endorses intermittent vaginal spotting x1 week ago that worsened to steady vaginal bleeding last night. She reports saturating 2 panty liners per day since yesterday. She reports abdominal "fullness" but denies abd pain. She also endorses some generalized lightheadedness and fatigue. Pt with hx of miscarriage at 14 weeks in 2018. She also reports having 3 abortions in the past. Denies back pain, dysuria,  abd pain, nausea, vomiting diarrhea, leg swelling, CP, SOB, and visual changes. +hx of STI: chlamydia in the past. Sexually active with one male partner, edison. OBGYN: Dr. Alvarado Guaman. No daily meds. NKDA. 30 year old F  at 7weeks LMP 20 (not IUP confirmed) with pmhx of ectopic pregnancy in 2017 treated with MTX and uterine fibroid presents to ED c/o worsening vaginal bleeding. Pt reports her LMP was on 20 and she took a home pregnancy test 2 weeks ago that resulted positive. She endorses intermittent vaginal spotting x1 week ago that worsened to steady vaginal bleeding last night. She reports saturating 2 panty liners per day since yesterday. She reports abdominal "fullness" but denies abd pain. She also endorses some generalized lightheadedness and fatigue. Pt with hx of miscarriage at 14 weeks in 2018. She also reports having 3 abortions in the past. Denies back pain, dysuria,  abd pain, nausea, vomiting diarrhea, leg swelling, CP, SOB, and visual changes. +hx of STI: chlamydia in the past. Sexually active with one male partner, edison. OBGYN: Dr. Alvarado Guaman. No daily meds. NKDA.

## 2021-01-05 NOTE — ED ADULT NURSE NOTE - NSIMPLEMENTINTERV_GEN_ALL_ED
Implemented All Universal Safety Interventions:  [place] to call system. Call bell, personal items and telephone within reach. Instruct patient to call for assistance. Room bathroom lighting operational. Non-slip footwear when patient is off stretcher. Physically safe environment: no spills, clutter or unnecessary equipment. Stretcher in lowest position, wheels locked, appropriate side rails in place.

## 2021-01-05 NOTE — ED PROVIDER NOTE - PATIENT PORTAL LINK FT
You can access the FollowMyHealth Patient Portal offered by [facility] by registering at the following website: [email or web address]. By joining [employer]’s FollowMyHealth portal, you will also be able to view your health information using other applications (apps) compatible with our system.

## 2021-01-05 NOTE — ED PROVIDER NOTE - CLINICAL SUMMARY MEDICAL DECISION MAKING FREE TEXT BOX
KATIE Steele: 31yo F  at 7weeks LMP 20 (home Upreg+; no IUP confirmed), previous ectopic, p/w vaginal bleeding x 1 week. +assoc lightheadedness. No saturated pads. No abdominal pain, back pain, cp, sob, f/c, or dysuria. VSS. Abdomen soft, nt. Pelvic exam noteable for 1 small blood clot otherwise minimal bleeding, closed cervix, no tenderness. Plan to check cbc, t&s, hcg, UA/Ucx, and obtain pelvic US, re-eval. KATIE Steele: 29yo F  at 7weeks LMP 20 (home Upreg+; no IUP confirmed), previous ectopic, p/w vaginal bleeding x 1 week. +assoc lightheadedness. No saturated pads. No abdominal pain, back pain, cp, sob, f/c, or dysuria. VSS. Abdomen soft, nt. Pelvic exam noteable for 1 small blood clot otherwise minimal bleeding, closed cervix, no tenderness. Plan to check cbc, t&s, hcg, UA/Ucx, and obtain pelvic US, re-eval.    Adry Lacey MD - Attending Physician: pt here with vaginal bleeding, mild x 1 week, no abd pain. Brief episode of lightheadness but no chest pain/sob. No concern for significant anemia. Likely early preg vs SAB. Less likely ectopic but given no prior confirmed IUP will get US to confirm

## 2021-01-06 DIAGNOSIS — Z87.59 PERSONAL HISTORY OF OTHER COMPLICATIONS OF PREGNANCY, CHILDBIRTH AND THE PUERPERIUM: Chronic | ICD-10-CM

## 2021-01-06 LAB
CULTURE RESULTS: SIGNIFICANT CHANGE UP
SPECIMEN SOURCE: SIGNIFICANT CHANGE UP

## 2021-01-19 ENCOUNTER — OUTPATIENT (OUTPATIENT)
Dept: OUTPATIENT SERVICES | Facility: HOSPITAL | Age: 31
LOS: 1 days | End: 2021-01-19
Payer: SELF-PAY

## 2021-01-19 ENCOUNTER — APPOINTMENT (OUTPATIENT)
Dept: ULTRASOUND IMAGING | Facility: HOSPITAL | Age: 31
End: 2021-01-19
Payer: SELF-PAY

## 2021-01-19 DIAGNOSIS — Z00.8 ENCOUNTER FOR OTHER GENERAL EXAMINATION: ICD-10-CM

## 2021-01-19 DIAGNOSIS — Z87.59 PERSONAL HISTORY OF OTHER COMPLICATIONS OF PREGNANCY, CHILDBIRTH AND THE PUERPERIUM: Chronic | ICD-10-CM

## 2021-01-19 PROBLEM — O03.9 COMPLETE OR UNSPECIFIED SPONTANEOUS ABORTION WITHOUT COMPLICATION: Chronic | Status: ACTIVE | Noted: 2021-01-06

## 2021-01-19 PROCEDURE — 76817 TRANSVAGINAL US OBSTETRIC: CPT | Mod: 26

## 2021-01-19 PROCEDURE — 76817 TRANSVAGINAL US OBSTETRIC: CPT

## 2021-06-28 NOTE — ED PROVIDER NOTE - MUSCULOSKELETAL NEGATIVE STATEMENT, MLM
Marbin Mcknight Patient Age: 66 year old  MESSAGE:   Prateek ([facility]) calling in regards to requesting a work-in appointment with . Prateek states first available is too far and would like to see if  is willing to provide the work-in appointment since patient is experiencing bladder issues, possible urinary frequency and urgency  (Patient cannot control his bladder)  Please advise once available  Phone: [phone]     WEIGHT AND HEIGHT:   Wt Readings from Last 1 Encounters:   03/04/21 90.7 kg (200 lb)     Ht Readings from Last 1 Encounters:   03/04/21 5' 10\" (1.778 m)     BMI Readings from Last 1 Encounters:   03/04/21 28.70 kg/m²       ALLERGIES:  Patient has no known allergies.  Current Outpatient Medications   Medication   • tamsulosin (FLOMAX) 0.4 MG Cap   • warfarin (COUMADIN) 4 MG tablet   • metoPROLOL tartrate (LOPRESSOR) 50 MG tablet   • metFORMIN (GLUCOPHAGE) 850 MG tablet   • lovastatin (MEVACOR) 20 MG tablet   • lisinopril-hydroCHLOROthiazide (ZESTORETIC) 20-12.5 MG per tablet   • amoxicillin (AMOXIL) 500 MG tablet     No current facility-administered medications for this visit.     PHARMACY to use:           Pharmacy preference(s) on file:   [facility] [place] [place] - [address] AT Portal to [facility]  [address]  [facility] [address]  Phone: [phone] Fax: [phone]      CALL BACK INFO: Ok to leave response (including medical information) with family member or on answering machine  ROUTING: Patient's physician/staff        PCP: Db Shea MD         INS: Payor: [employer] / Plan: [id number] / Product Type: MEDICARE   PATIENT ADDRESS:  [address] [place] [id number]  
Called Prateek ([facility]) and let him know. He states understanding    Meaagn Sandy RN    
Called Sourav ([facility]) and patient. Patient states he has been having frequency and urgency for over a year now. Advised patient to avoid bladder irritants and increase water. Patient states he will do this. Patient is taking tamsulosin daily. Patient denies any burning with urination or blood in the urine. Patient states he feels like he is emptying all the way. Appointment made for first avail with Dr. Vick on 7/27. Patient knows to get PSA prior.     Dr. Vick please advise on any further recs in the meantime    Thanks  Megaan Sandy RN    
No additional recommendations at this time.  Thank you  
no back pain, no gout, no musculoskeletal pain, no neck pain, and no weakness.

## 2021-08-16 NOTE — ED PROVIDER NOTE - TEMPLATE
Chiro may or may not help, PT may help.     If she'd like to be see to try any other medical therapies as well, please have her make appt. MAT  
From: Sigrid Tavarez  To: Era Armijo  Sent: 8/16/2021 1:23 PM CDT  Subject: Non-Urgent Medical Question    Hello,  I seemed to have irritated my lower back. It is mainly sore and achy, but when I drive in the car it will spasm when I get out and I have to wait until it passes before I can walk. I've stopped running and riding my bike to give it rest, but have continued to walk as able. I've been icing, trying to stretch, and using ibuprofen. It will be 2 weeks on Wednesday without much improvement. I'm wondering if I should try something else. A coworker of mine recommends seeing a chiropractor, however, I've never seen one. I would appreciate any recommendations. Thank you. Sigrid  
Reply sent.   
See message and advise please.    
OB/GYN

## 2021-09-22 NOTE — ED PROVIDER NOTE - MEDICAL DECISION MAKING DETAILS
What Type Of Note Output Would You Prefer (Optional)?: Standard Output
How Severe Is Your Rash?: mild
Is This A New Presentation, Or A Follow-Up?: Rash
pt p/w vag discharge, + candida on pelvic exam. will be treated with clotrimazole.

## 2022-09-15 NOTE — ED ADULT NURSE NOTE - NS ED NURSE RECORD ANOTHER VITAL SIGN
Yes, record another set of vital signs
Patient requests all Lab, Cardiology, and Radiology Results on their Discharge Instructions

## 2022-10-02 ENCOUNTER — EMERGENCY (EMERGENCY)
Facility: HOSPITAL | Age: 32
LOS: 1 days | Discharge: HOME | End: 2022-10-02
Attending: STUDENT IN AN ORGANIZED HEALTH CARE EDUCATION/TRAINING PROGRAM | Admitting: STUDENT IN AN ORGANIZED HEALTH CARE EDUCATION/TRAINING PROGRAM
Payer: MEDICAID

## 2022-10-02 VITALS
WEIGHT: 115.08 LBS | TEMPERATURE: 97 F | DIASTOLIC BLOOD PRESSURE: 81 MMHG | HEART RATE: 124 BPM | SYSTOLIC BLOOD PRESSURE: 109 MMHG | HEIGHT: 61 IN | OXYGEN SATURATION: 97 % | RESPIRATION RATE: 18 BRPM

## 2022-10-02 VITALS
DIASTOLIC BLOOD PRESSURE: 73 MMHG | SYSTOLIC BLOOD PRESSURE: 115 MMHG | HEART RATE: 95 BPM | RESPIRATION RATE: 17 BRPM | OXYGEN SATURATION: 99 %

## 2022-10-02 DIAGNOSIS — Z87.59 PERSONAL HISTORY OF OTHER COMPLICATIONS OF PREGNANCY, CHILDBIRTH AND THE PUERPERIUM: Chronic | ICD-10-CM

## 2022-10-02 LAB
ALBUMIN SERPL ELPH-MCNC: 5.3 G/DL — HIGH (ref 3.3–5)
ALP SERPL-CCNC: 134 U/L — HIGH (ref 40–120)
ALT FLD-CCNC: 24 U/L — SIGNIFICANT CHANGE UP (ref 10–45)
ANION GAP SERPL CALC-SCNC: 15 MMOL/L — SIGNIFICANT CHANGE UP (ref 5–17)
ANION GAP SERPL CALC-SCNC: 24 MMOL/L — HIGH (ref 5–17)
ANION GAP SERPL CALC-SCNC: 29 MMOL/L — HIGH (ref 5–17)
AST SERPL-CCNC: 36 U/L — SIGNIFICANT CHANGE UP (ref 10–40)
BASE EXCESS BLDV CALC-SCNC: -11.2 MMOL/L — LOW (ref -2–3)
BASOPHILS # BLD AUTO: 0.02 K/UL — SIGNIFICANT CHANGE UP (ref 0–0.2)
BASOPHILS NFR BLD AUTO: 0.2 % — SIGNIFICANT CHANGE UP (ref 0–2)
BILIRUB SERPL-MCNC: 1.2 MG/DL — SIGNIFICANT CHANGE UP (ref 0.2–1.2)
BUN SERPL-MCNC: 10 MG/DL — SIGNIFICANT CHANGE UP (ref 7–23)
BUN SERPL-MCNC: 7 MG/DL — SIGNIFICANT CHANGE UP (ref 7–23)
BUN SERPL-MCNC: 9 MG/DL — SIGNIFICANT CHANGE UP (ref 7–23)
CALCIUM SERPL-MCNC: 10.2 MG/DL — SIGNIFICANT CHANGE UP (ref 8.4–10.5)
CALCIUM SERPL-MCNC: 8.6 MG/DL — SIGNIFICANT CHANGE UP (ref 8.4–10.5)
CALCIUM SERPL-MCNC: 8.7 MG/DL — SIGNIFICANT CHANGE UP (ref 8.4–10.5)
CHLORIDE SERPL-SCNC: 101 MMOL/L — SIGNIFICANT CHANGE UP (ref 96–108)
CHLORIDE SERPL-SCNC: 105 MMOL/L — SIGNIFICANT CHANGE UP (ref 96–108)
CHLORIDE SERPL-SCNC: 107 MMOL/L — SIGNIFICANT CHANGE UP (ref 96–108)
CO2 BLDV-SCNC: 16 MMOL/L — LOW (ref 22–26)
CO2 SERPL-SCNC: 11 MMOL/L — LOW (ref 22–31)
CO2 SERPL-SCNC: 11 MMOL/L — LOW (ref 22–31)
CO2 SERPL-SCNC: 18 MMOL/L — LOW (ref 22–31)
CREAT SERPL-MCNC: 0.98 MG/DL — SIGNIFICANT CHANGE UP (ref 0.5–1.3)
CREAT SERPL-MCNC: 1.11 MG/DL — SIGNIFICANT CHANGE UP (ref 0.5–1.3)
CREAT SERPL-MCNC: 1.32 MG/DL — HIGH (ref 0.5–1.3)
EGFR: 55 ML/MIN/1.73M2 — LOW
EGFR: 68 ML/MIN/1.73M2 — SIGNIFICANT CHANGE UP
EGFR: 78 ML/MIN/1.73M2 — SIGNIFICANT CHANGE UP
EOSINOPHIL # BLD AUTO: 0 K/UL — SIGNIFICANT CHANGE UP (ref 0–0.5)
EOSINOPHIL NFR BLD AUTO: 0 % — SIGNIFICANT CHANGE UP (ref 0–6)
GAS PNL BLDV: SIGNIFICANT CHANGE UP
GLUCOSE BLDC GLUCOMTR-MCNC: 108 MG/DL — HIGH (ref 70–99)
GLUCOSE SERPL-MCNC: 122 MG/DL — HIGH (ref 70–99)
GLUCOSE SERPL-MCNC: 156 MG/DL — HIGH (ref 70–99)
GLUCOSE SERPL-MCNC: 165 MG/DL — HIGH (ref 70–99)
HCO3 BLDV-SCNC: 15 MMOL/L — LOW (ref 22–29)
HCT VFR BLD CALC: 47.7 % — HIGH (ref 34.5–45)
HGB BLD-MCNC: 15.5 G/DL — SIGNIFICANT CHANGE UP (ref 11.5–15.5)
IMM GRANULOCYTES NFR BLD AUTO: 0.4 % — SIGNIFICANT CHANGE UP (ref 0–0.9)
LACTATE SERPL-SCNC: 1.3 MMOL/L — SIGNIFICANT CHANGE UP (ref 0.7–2)
LACTATE SERPL-SCNC: 2.5 MMOL/L — HIGH (ref 0.7–2)
LYMPHOCYTES # BLD AUTO: 1.23 K/UL — SIGNIFICANT CHANGE UP (ref 1–3.3)
LYMPHOCYTES # BLD AUTO: 11.9 % — LOW (ref 13–44)
MCHC RBC-ENTMCNC: 29.4 PG — SIGNIFICANT CHANGE UP (ref 27–34)
MCHC RBC-ENTMCNC: 32.5 GM/DL — SIGNIFICANT CHANGE UP (ref 32–36)
MCV RBC AUTO: 90.5 FL — SIGNIFICANT CHANGE UP (ref 80–100)
MONOCYTES # BLD AUTO: 0.47 K/UL — SIGNIFICANT CHANGE UP (ref 0–0.9)
MONOCYTES NFR BLD AUTO: 4.5 % — SIGNIFICANT CHANGE UP (ref 2–14)
NEUTROPHILS # BLD AUTO: 8.61 K/UL — HIGH (ref 1.8–7.4)
NEUTROPHILS NFR BLD AUTO: 83 % — HIGH (ref 43–77)
NRBC # BLD: 0 /100 WBCS — SIGNIFICANT CHANGE UP (ref 0–0)
PCO2 BLDV: 30 MMHG — LOW (ref 39–42)
PH BLDV: 7.31 — LOW (ref 7.32–7.43)
PLATELET # BLD AUTO: 362 K/UL — SIGNIFICANT CHANGE UP (ref 150–400)
PO2 BLDV: 53 MMHG — HIGH (ref 25–45)
POTASSIUM SERPL-MCNC: 4.1 MMOL/L — SIGNIFICANT CHANGE UP (ref 3.5–5.3)
POTASSIUM SERPL-MCNC: 4.2 MMOL/L — SIGNIFICANT CHANGE UP (ref 3.5–5.3)
POTASSIUM SERPL-MCNC: 4.8 MMOL/L — SIGNIFICANT CHANGE UP (ref 3.5–5.3)
POTASSIUM SERPL-SCNC: 4.1 MMOL/L — SIGNIFICANT CHANGE UP (ref 3.5–5.3)
POTASSIUM SERPL-SCNC: 4.2 MMOL/L — SIGNIFICANT CHANGE UP (ref 3.5–5.3)
POTASSIUM SERPL-SCNC: 4.8 MMOL/L — SIGNIFICANT CHANGE UP (ref 3.5–5.3)
PROT SERPL-MCNC: 10.2 G/DL — HIGH (ref 6–8.3)
RBC # BLD: 5.27 M/UL — HIGH (ref 3.8–5.2)
RBC # FLD: 16.6 % — HIGH (ref 10.3–14.5)
SAO2 % BLDV: 87.5 % — SIGNIFICANT CHANGE UP (ref 67–88)
SODIUM SERPL-SCNC: 138 MMOL/L — SIGNIFICANT CHANGE UP (ref 135–145)
SODIUM SERPL-SCNC: 141 MMOL/L — SIGNIFICANT CHANGE UP (ref 135–145)
SODIUM SERPL-SCNC: 142 MMOL/L — SIGNIFICANT CHANGE UP (ref 135–145)
WBC # BLD: 10.37 K/UL — SIGNIFICANT CHANGE UP (ref 3.8–10.5)
WBC # FLD AUTO: 10.37 K/UL — SIGNIFICANT CHANGE UP (ref 3.8–10.5)

## 2022-10-02 PROCEDURE — 85025 COMPLETE CBC W/AUTO DIFF WBC: CPT

## 2022-10-02 PROCEDURE — 80053 COMPREHEN METABOLIC PANEL: CPT

## 2022-10-02 PROCEDURE — 36415 COLL VENOUS BLD VENIPUNCTURE: CPT

## 2022-10-02 PROCEDURE — 80048 BASIC METABOLIC PNL TOTAL CA: CPT

## 2022-10-02 PROCEDURE — 96360 HYDRATION IV INFUSION INIT: CPT

## 2022-10-02 PROCEDURE — 99285 EMERGENCY DEPT VISIT HI MDM: CPT

## 2022-10-02 PROCEDURE — 82803 BLOOD GASES ANY COMBINATION: CPT

## 2022-10-02 PROCEDURE — 93005 ELECTROCARDIOGRAM TRACING: CPT

## 2022-10-02 PROCEDURE — 82962 GLUCOSE BLOOD TEST: CPT

## 2022-10-02 PROCEDURE — 93010 ELECTROCARDIOGRAM REPORT: CPT

## 2022-10-02 PROCEDURE — 99284 EMERGENCY DEPT VISIT MOD MDM: CPT | Mod: 25

## 2022-10-02 PROCEDURE — 96361 HYDRATE IV INFUSION ADD-ON: CPT

## 2022-10-02 PROCEDURE — 83605 ASSAY OF LACTIC ACID: CPT

## 2022-10-02 PROCEDURE — 81025 URINE PREGNANCY TEST: CPT

## 2022-10-02 RX ORDER — SODIUM CHLORIDE 9 MG/ML
1000 INJECTION INTRAMUSCULAR; INTRAVENOUS; SUBCUTANEOUS ONCE
Refills: 0 | Status: COMPLETED | OUTPATIENT
Start: 2022-10-02 | End: 2022-10-02

## 2022-10-02 RX ORDER — SODIUM CHLORIDE 9 MG/ML
1000 INJECTION, SOLUTION INTRAVENOUS ONCE
Refills: 0 | Status: COMPLETED | OUTPATIENT
Start: 2022-10-02 | End: 2022-10-02

## 2022-10-02 RX ORDER — SODIUM CHLORIDE 9 MG/ML
1000 INJECTION, SOLUTION INTRAVENOUS
Refills: 0 | Status: DISCONTINUED | OUTPATIENT
Start: 2022-10-02 | End: 2022-10-02

## 2022-10-02 RX ORDER — SODIUM CHLORIDE 9 MG/ML
1000 INJECTION, SOLUTION INTRAVENOUS
Refills: 0 | Status: DISCONTINUED | OUTPATIENT
Start: 2022-10-02 | End: 2022-10-05

## 2022-10-02 RX ADMIN — SODIUM CHLORIDE 1000 MILLILITER(S): 9 INJECTION INTRAMUSCULAR; INTRAVENOUS; SUBCUTANEOUS at 03:13

## 2022-10-02 RX ADMIN — SODIUM CHLORIDE 150 MILLILITER(S): 9 INJECTION, SOLUTION INTRAVENOUS at 04:40

## 2022-10-02 RX ADMIN — SODIUM CHLORIDE 1000 MILLILITER(S): 9 INJECTION INTRAMUSCULAR; INTRAVENOUS; SUBCUTANEOUS at 02:13

## 2022-10-02 RX ADMIN — SODIUM CHLORIDE 1000 MILLILITER(S): 9 INJECTION, SOLUTION INTRAVENOUS at 03:32

## 2022-10-02 NOTE — ED PROVIDER NOTE - PROGRESS NOTE DETAILS
Erna De Anda MD, Attending   pt reassessed, feeling much better. labs, consistent with starvation. Pt tolerating PO food, drank 2 ginger ales, now feels much better, able to get out of bed and walk to the bathroom briskly without assistance. Erna De Anda MD, Attending  PT drank juice, ate crackers and part of a sandwich. father at bedside. Erna De Anda MD, Attending  AC closed, Bicarb improving. PT able to tolerate PO agrees to keep doing so at home. Pt and dad at bedside agreeable to going home.

## 2022-10-02 NOTE — ED PROVIDER NOTE - NS ED ROS FT
GENERAL: No fever, chills + generlized weakness  EYES: no vision changes, no discharge.   ENT: no difficulty swallowing or speaking   CARDIAC: no chest pain/pressure, SOB, lower extremity swelling  PULMONARY: no cough, SOB  GI: no abdominal pain, n/v/d  : no dysuria, no hematuria  SKIN: no rashes, no ecchymosis  NEURO: no headache, +lightheadedness  MSK: No joint pain, myalgia, weakness.

## 2022-10-02 NOTE — ED ADULT TRIAGE NOTE - CHIEF COMPLAINT QUOTE
I feel very dizzy and had fallen couple of times today. I have been fasting for the last week for spiritual reasons"

## 2022-10-02 NOTE — ED PROVIDER NOTE - PATIENT PORTAL LINK FT
You can access the FollowMyHealth Patient Portal offered by [facility] by registering at the following website: [email or web address]. By joining [employer]’s FollowMyHealth portal, you will also be able to view your health information using other applications (apps) compatible with our system.

## 2022-10-02 NOTE — ED ADULT NURSE NOTE - OBJECTIVE STATEMENT
Patient states "I have been fasting for the past week and have had multiple falls". Patient endorses weakness and vomiting. Patient denies LOC, SOB, chest pain, dizziness and blurry vision.

## 2022-10-02 NOTE — ED PROVIDER NOTE - CLINICAL SUMMARY MEDICAL DECISION MAKING FREE TEXT BOX
31 yo F no sig pmhx, pw lightheadedness dizziness and fall. Pt has been fasting for 5 days, drinking only water. Pt reports 15 lbs weight loss in 15 days. Pt had been feeling fine until yesterday when she noticed, she was falling, felt weak, seeing stars. Denies head strike or LOC. Exam as noted. ddx: dehydration, starvation ketosis, electrolyte disturbance. labs, lactate, ekg, IVF, calories, anticipate DC.

## 2022-10-02 NOTE — ED PROVIDER NOTE - OBJECTIVE STATEMENT
31 yo F no sig pmhx, pw lightheadedness dizziness and fall. Pt has been fasting for 5 days, drinking only water. Pt reports 15 lbs weight loss in 15 days. Pt had been feeling fine until yesterday when she noticed, she was falling, felt weak, seeing stars. Denies head strike or LOC.

## 2022-10-02 NOTE — ED PROVIDER NOTE - NSICDXPASTMEDICALHX_GEN_ALL_CORE_FT
PAST MEDICAL HISTORY:  Ectopic pregnancy without intrauterine pregnancy, unspecified location     Miscarriage

## 2022-10-02 NOTE — ED ADULT NURSE NOTE - HOW OFTEN DO YOU HAVE A DRINK CONTAINING ALCOHOL?
Please notify pt that she can increase Zoloft to 150mg and new Erx sent along with other refills.  See Deisi Castellanos NP as discussed.  Pauline Wang CNP     Never

## 2022-10-02 NOTE — ED PROVIDER NOTE - NSFOLLOWUPINSTRUCTIONS_ED_ALL_ED_FT
** Follow up with your primary care doctor in the next 72 hours.     ** Go to the nearest Emergency Department if you experience any new or concerning symptoms, such as:   - chest pain  - difficulty breathing  - passing out  - unable to eat or drink  - unable to move or feel part of your body  - fever, chills **Continue to increase your oral intake.     ** Follow up with your primary care doctor in the next 72 hours.     ** Go to the nearest Emergency Department if you experience any new or concerning symptoms, such as:   - chest pain  - difficulty breathing  - passing out  - unable to eat or drink  - unable to move or feel part of your body  - fever, chills

## 2022-10-02 NOTE — ED PROVIDER NOTE - PHYSICAL EXAMINATION
GEN: Patient awake alert NAD.   HEENT: normocephalic, atraumatic, EOMI, no scleral icterus, + dry MM  CARDIAC: RRR, S1, S2, no murmur.   PULM: CTA B/L no wheeze, rhonchi, rales.   ABD: soft NT, ND, no rebound no guarding  MSK: Moving all extremities, no edma, 5/5 strength and full ROM in all extremities.   NEURO: A&Ox3, gait normal, no focal neurological deficits, CN 2-12 grossly intact  SKIN: warm, dry, no rash.

## 2023-03-17 NOTE — ED ADULT NURSE NOTE - PAIN: PRESENCE, MLM
[FreeTextEntry1] : 75 y/o female, nonsmoker with a PMH of GERD (Dexilant) hiatal hernia, esophagitis, lung nodules, hepatic lesion. She presents for surgical of her hiatal hernia. She is referred by Dr. Galvan. Studies as below:\par \par CT angio cardiac completed on 02/01/23:\par -multiple solid and subsolid pulmonary nodules measuring up to 9mm \par -partially visualized hypoattenuating right hepatic segment 8 lesion measuring 1.9cm. \par -large hiatal hernia\par \par Abdominal US completed on 02/22/23:\par -echogenic liver parenchyma, which can be seen with hepatic steatosis or chronic liver disease\par -two small cysts versus one cyst are present in the posterior right lobe measuring 2.1 x 2.1 x 2.5cm\par -simple cyst is present in the left kidney measuring 1cm\par \par EGD completed on 12/29/22:\par -Grade B reflux esophagitis with no bleeding\par -small hiatal hernia\par -normal stomach\par -normal examined duodenum \par \par Chest Imaging reviewed with the patient and shows subcentimeters nodules of unclear etiology which will be followed with a chest CT in 6 months by Dr. Galvan. Her chest CT also shows a moderate to large hiatal hernia. Her symptoms include reflux now controlled with medications. She has had abdominal pain and some vomiting in the past but has improved with diet changes and lifestyle modifications. She has not had any pain or vomiting in several months. We discussed that if she was asymptomatic, we would not consider an operation on her hernia. However as she has symptoms of reflux and occasional vomiting, hiatal hernia can be considered. If she has persistent vomiting, she definitely needs hiatal hernia repair.\par \par The risks and benefits of EGD, Robotic Assisted Diaphragmatic/Hiatal Hernia Repair with mesh, fundoplication, possible open were discussed with the patient including but not limited to risks of infection, bleeding, pneumonias, thromboembolic complications, arrhythmias, myocardial infarction, as well as the risks of anesthesia. Specific risks discussed included postoperative dysphagia requiring intervention as well as recurrence of hernia . A estevan discussion of the procedure was performed and all questions were answered.  \par \par She wishes to defer surgery at this time as her symptoms have improved.\par \par She will need UGI, Manometry, pH testing, PFTS, ECHO, and a cardiology clearance prior to surgery if she wishes to proceed.\par \par Plan:\par 1. Followup PRN\par 2. Chest CT as per Dr. Galvan\par \par I, Maria Isabel Alcala RN, am scribing for and the presence of Dr. Nella Sanders  the following sections: history of present illness, past medical/family/surgical/family/social history, review of systems, vital signs, physical exam, and disposition.\par \par INella MD personally performed the services described in the documentation, reviewed the documentation recorded by the scribe in my presence and it accurately and completely records my words and actions. I have personally seen, examined, and participated in the care of this patient.  I have reviewed all pertinent clinical information, including history and physical exam and plan.  I agree with the above history, physical and plan of the ACP which I have reviewed and edited where appropriate.\par \par \par \par \par  denies pain/discomfort

## 2023-04-11 ENCOUNTER — NON-APPOINTMENT (OUTPATIENT)
Age: 33
End: 2023-04-11

## 2023-04-11 DIAGNOSIS — Z83.3 FAMILY HISTORY OF DIABETES MELLITUS: ICD-10-CM

## 2023-04-11 DIAGNOSIS — Z82.49 FAMILY HISTORY OF ISCHEMIC HEART DISEASE AND OTHER DISEASES OF THE CIRCULATORY SYSTEM: ICD-10-CM

## 2023-04-11 DIAGNOSIS — Z86.018 PERSONAL HISTORY OF OTHER BENIGN NEOPLASM: ICD-10-CM

## 2023-04-11 DIAGNOSIS — Z80.3 FAMILY HISTORY OF MALIGNANT NEOPLASM OF BREAST: ICD-10-CM

## 2023-04-11 DIAGNOSIS — Z83.438 FAMILY HISTORY OF OTHER DISORDER OF LIPOPROTEIN METABOLISM AND OTHER LIPIDEMIA: ICD-10-CM

## 2023-07-16 NOTE — ED PROVIDER NOTE - NS ED MD DISPO DISCHARGE CCDA
Alert, cooperative and visible intermittently. No SI or HI noted. Denies depression, anxiety and pain. Attended exercise, coping skills, life skills, spiritual resource and fresh air. Consumed 100% of breakfast and 100% of lunch. Took all medication without prompting. Maintained on safe precautions without incident.  Will continue to monitor progress and recovery program. Patient/Caregiver provided printed discharge information.

## 2024-03-11 ENCOUNTER — NON-APPOINTMENT (OUTPATIENT)
Age: 34
End: 2024-03-11

## 2024-09-20 ENCOUNTER — INPATIENT (INPATIENT)
Facility: HOSPITAL | Age: 34
LOS: 1 days | Discharge: HOME | DRG: 641 | End: 2024-09-22
Attending: STUDENT IN AN ORGANIZED HEALTH CARE EDUCATION/TRAINING PROGRAM | Admitting: INTERNAL MEDICINE
Payer: SELF-PAY

## 2024-09-20 ENCOUNTER — RESULT REVIEW (OUTPATIENT)
Age: 34
End: 2024-09-20

## 2024-09-20 VITALS
HEART RATE: 126 BPM | WEIGHT: 125 LBS | DIASTOLIC BLOOD PRESSURE: 88 MMHG | RESPIRATION RATE: 18 BRPM | HEIGHT: 62 IN | OXYGEN SATURATION: 98 % | TEMPERATURE: 97 F | SYSTOLIC BLOOD PRESSURE: 109 MMHG

## 2024-09-20 DIAGNOSIS — Z87.59 PERSONAL HISTORY OF OTHER COMPLICATIONS OF PREGNANCY, CHILDBIRTH AND THE PUERPERIUM: Chronic | ICD-10-CM

## 2024-09-20 DIAGNOSIS — R79.89 OTHER SPECIFIED ABNORMAL FINDINGS OF BLOOD CHEMISTRY: ICD-10-CM

## 2024-09-20 DIAGNOSIS — R11.2 NAUSEA WITH VOMITING, UNSPECIFIED: ICD-10-CM

## 2024-09-20 DIAGNOSIS — E87.20 ACIDOSIS, UNSPECIFIED: ICD-10-CM

## 2024-09-20 DIAGNOSIS — F50.89 OTHER SPECIFIED EATING DISORDER: ICD-10-CM

## 2024-09-20 LAB
ACETONE SERPL-MCNC: ABNORMAL
ALBUMIN SERPL ELPH-MCNC: 3.5 G/DL — SIGNIFICANT CHANGE UP (ref 3.3–5)
ALBUMIN SERPL ELPH-MCNC: 3.6 G/DL — SIGNIFICANT CHANGE UP (ref 3.3–5)
ALBUMIN SERPL ELPH-MCNC: 4.2 G/DL — SIGNIFICANT CHANGE UP (ref 3.3–5)
ALBUMIN SERPL ELPH-MCNC: 5.2 G/DL — HIGH (ref 3.3–5)
ALP SERPL-CCNC: 106 U/L — SIGNIFICANT CHANGE UP (ref 40–120)
ALP SERPL-CCNC: 107 U/L — SIGNIFICANT CHANGE UP (ref 40–120)
ALP SERPL-CCNC: 123 U/L — HIGH (ref 40–120)
ALP SERPL-CCNC: 153 U/L — HIGH (ref 40–120)
ALT FLD-CCNC: 17 U/L — SIGNIFICANT CHANGE UP (ref 10–45)
ALT FLD-CCNC: 19 U/L — SIGNIFICANT CHANGE UP (ref 10–45)
ALT FLD-CCNC: 21 U/L — SIGNIFICANT CHANGE UP (ref 10–45)
ALT FLD-CCNC: 26 U/L — SIGNIFICANT CHANGE UP (ref 10–45)
ANION GAP SERPL CALC-SCNC: 10 MMOL/L — SIGNIFICANT CHANGE UP (ref 5–17)
ANION GAP SERPL CALC-SCNC: 17 MMOL/L — SIGNIFICANT CHANGE UP (ref 5–17)
ANION GAP SERPL CALC-SCNC: 26 MMOL/L — HIGH (ref 5–17)
ANION GAP SERPL CALC-SCNC: 8 MMOL/L — SIGNIFICANT CHANGE UP (ref 5–17)
APPEARANCE UR: CLEAR — SIGNIFICANT CHANGE UP
AST SERPL-CCNC: 19 U/L — SIGNIFICANT CHANGE UP (ref 10–40)
AST SERPL-CCNC: 20 U/L — SIGNIFICANT CHANGE UP (ref 10–40)
AST SERPL-CCNC: 28 U/L — SIGNIFICANT CHANGE UP (ref 10–40)
AST SERPL-CCNC: 45 U/L — HIGH (ref 10–40)
BACTERIA # UR AUTO: ABNORMAL /HPF
BASE EXCESS BLDV CALC-SCNC: -19.9 MMOL/L — LOW (ref -2–3)
BASE EXCESS BLDV CALC-SCNC: 1.8 MMOL/L — SIGNIFICANT CHANGE UP (ref -2–3)
BASOPHILS # BLD AUTO: 0.04 K/UL — SIGNIFICANT CHANGE UP (ref 0–0.2)
BASOPHILS NFR BLD AUTO: 0.4 % — SIGNIFICANT CHANGE UP (ref 0–2)
BILIRUB DIRECT SERPL-MCNC: 0.3 MG/DL — SIGNIFICANT CHANGE UP (ref 0–0.3)
BILIRUB SERPL-MCNC: 1.4 MG/DL — HIGH (ref 0.2–1.2)
BILIRUB SERPL-MCNC: 1.6 MG/DL — HIGH (ref 0.2–1.2)
BILIRUB SERPL-MCNC: 1.6 MG/DL — HIGH (ref 0.2–1.2)
BILIRUB SERPL-MCNC: 1.7 MG/DL — HIGH (ref 0.2–1.2)
BILIRUB UR-MCNC: NEGATIVE — SIGNIFICANT CHANGE UP
BLD GP AB SCN SERPL QL: SIGNIFICANT CHANGE UP
BLOOD GAS COMMENTS, VENOUS: SIGNIFICANT CHANGE UP
BUN SERPL-MCNC: 12 MG/DL — SIGNIFICANT CHANGE UP (ref 7–23)
BUN SERPL-MCNC: 6 MG/DL — LOW (ref 7–23)
BUN SERPL-MCNC: 6 MG/DL — LOW (ref 7–23)
BUN SERPL-MCNC: 8 MG/DL — SIGNIFICANT CHANGE UP (ref 7–23)
CALCIUM SERPL-MCNC: 10.3 MG/DL — SIGNIFICANT CHANGE UP (ref 8.4–10.5)
CALCIUM SERPL-MCNC: 8.2 MG/DL — LOW (ref 8.4–10.5)
CALCIUM SERPL-MCNC: 8.3 MG/DL — LOW (ref 8.4–10.5)
CALCIUM SERPL-MCNC: 9 MG/DL — SIGNIFICANT CHANGE UP (ref 8.4–10.5)
CHLORIDE SERPL-SCNC: 100 MMOL/L — SIGNIFICANT CHANGE UP (ref 96–108)
CHLORIDE SERPL-SCNC: 101 MMOL/L — SIGNIFICANT CHANGE UP (ref 96–108)
CHLORIDE SERPL-SCNC: 102 MMOL/L — SIGNIFICANT CHANGE UP (ref 96–108)
CHLORIDE SERPL-SCNC: 99 MMOL/L — SIGNIFICANT CHANGE UP (ref 96–108)
CO2 BLDV-SCNC: 10 MMOL/L — CRITICAL LOW (ref 22–26)
CO2 BLDV-SCNC: 26 MMOL/L — SIGNIFICANT CHANGE UP (ref 22–26)
CO2 SERPL-SCNC: 12 MMOL/L — LOW (ref 22–31)
CO2 SERPL-SCNC: 20 MMOL/L — LOW (ref 22–31)
CO2 SERPL-SCNC: 26 MMOL/L — SIGNIFICANT CHANGE UP (ref 22–31)
CO2 SERPL-SCNC: 27 MMOL/L — SIGNIFICANT CHANGE UP (ref 22–31)
COLOR SPEC: YELLOW — SIGNIFICANT CHANGE UP
COMMENT - URINE: SIGNIFICANT CHANGE UP
CREAT SERPL-MCNC: 0.79 MG/DL — SIGNIFICANT CHANGE UP (ref 0.5–1.3)
CREAT SERPL-MCNC: 0.82 MG/DL — SIGNIFICANT CHANGE UP (ref 0.5–1.3)
CREAT SERPL-MCNC: 0.93 MG/DL — SIGNIFICANT CHANGE UP (ref 0.5–1.3)
CREAT SERPL-MCNC: 1.14 MG/DL — SIGNIFICANT CHANGE UP (ref 0.5–1.3)
DIFF PNL FLD: ABNORMAL
EGFR: 100 ML/MIN/1.73M2 — SIGNIFICANT CHANGE UP
EGFR: 65 ML/MIN/1.73M2 — SIGNIFICANT CHANGE UP
EGFR: 83 ML/MIN/1.73M2 — SIGNIFICANT CHANGE UP
EGFR: 95 ML/MIN/1.73M2 — SIGNIFICANT CHANGE UP
EOSINOPHIL # BLD AUTO: 0.01 K/UL — SIGNIFICANT CHANGE UP (ref 0–0.5)
EOSINOPHIL NFR BLD AUTO: 0.1 % — SIGNIFICANT CHANGE UP (ref 0–6)
EPI CELLS # UR: 4 — SIGNIFICANT CHANGE UP
FOLATE SERPL-MCNC: 14.9 NG/ML — SIGNIFICANT CHANGE UP
GAS PNL BLDV: SIGNIFICANT CHANGE UP
GAS PNL BLDV: SIGNIFICANT CHANGE UP
GLUCOSE SERPL-MCNC: 132 MG/DL — HIGH (ref 70–99)
GLUCOSE SERPL-MCNC: 152 MG/DL — HIGH (ref 70–99)
GLUCOSE SERPL-MCNC: 160 MG/DL — HIGH (ref 70–99)
GLUCOSE SERPL-MCNC: 174 MG/DL — HIGH (ref 70–99)
GLUCOSE UR QL: NEGATIVE MG/DL — SIGNIFICANT CHANGE UP
HCG SERPL-ACNC: <1 MIU/ML — SIGNIFICANT CHANGE UP
HCO3 BLDV-SCNC: 25 MMOL/L — SIGNIFICANT CHANGE UP (ref 22–29)
HCO3 BLDV-SCNC: 9 MMOL/L — CRITICAL LOW (ref 22–29)
HCT VFR BLD CALC: 30.3 % — LOW (ref 34.5–45)
HCT VFR BLD CALC: 34.1 % — LOW (ref 34.5–45)
HCT VFR BLD CALC: 45.3 % — HIGH (ref 34.5–45)
HGB BLD-MCNC: 10.7 G/DL — LOW (ref 11.5–15.5)
HGB BLD-MCNC: 11.9 G/DL — SIGNIFICANT CHANGE UP (ref 11.5–15.5)
HGB BLD-MCNC: 14.9 G/DL — SIGNIFICANT CHANGE UP (ref 11.5–15.5)
HYALINE CASTS # UR AUTO: PRESENT
IMM GRANULOCYTES NFR BLD AUTO: 0.5 % — SIGNIFICANT CHANGE UP (ref 0–0.9)
IRON SATN MFR SERPL: 20 % — SIGNIFICANT CHANGE UP (ref 14–50)
IRON SATN MFR SERPL: 69 UG/DL — SIGNIFICANT CHANGE UP (ref 30–160)
KETONES UR-MCNC: >=160 MG/DL
LEUKOCYTE ESTERASE UR-ACNC: NEGATIVE — SIGNIFICANT CHANGE UP
LIDOCAIN IGE QN: 15 U/L — LOW (ref 16–77)
LIDOCAIN IGE QN: 19 U/L — SIGNIFICANT CHANGE UP (ref 16–77)
LYMPHOCYTES # BLD AUTO: 0.87 K/UL — LOW (ref 1–3.3)
LYMPHOCYTES # BLD AUTO: 8.8 % — LOW (ref 13–44)
MAGNESIUM SERPL-MCNC: 1.2 MG/DL — LOW (ref 1.6–2.6)
MAGNESIUM SERPL-MCNC: 1.7 MG/DL — SIGNIFICANT CHANGE UP (ref 1.6–2.6)
MAGNESIUM SERPL-MCNC: 1.8 MG/DL — SIGNIFICANT CHANGE UP (ref 1.6–2.6)
MCHC RBC-ENTMCNC: 30 PG — SIGNIFICANT CHANGE UP (ref 27–34)
MCHC RBC-ENTMCNC: 30.1 PG — SIGNIFICANT CHANGE UP (ref 27–34)
MCHC RBC-ENTMCNC: 30.2 PG — SIGNIFICANT CHANGE UP (ref 27–34)
MCHC RBC-ENTMCNC: 32.9 GM/DL — SIGNIFICANT CHANGE UP (ref 32–36)
MCHC RBC-ENTMCNC: 34.9 GM/DL — SIGNIFICANT CHANGE UP (ref 32–36)
MCHC RBC-ENTMCNC: 35.3 GM/DL — SIGNIFICANT CHANGE UP (ref 32–36)
MCV RBC AUTO: 85.4 FL — SIGNIFICANT CHANGE UP (ref 80–100)
MCV RBC AUTO: 86.5 FL — SIGNIFICANT CHANGE UP (ref 80–100)
MCV RBC AUTO: 91.1 FL — SIGNIFICANT CHANGE UP (ref 80–100)
MONOCYTES # BLD AUTO: 0.33 K/UL — SIGNIFICANT CHANGE UP (ref 0–0.9)
MONOCYTES NFR BLD AUTO: 3.3 % — SIGNIFICANT CHANGE UP (ref 2–14)
NEUTROPHILS # BLD AUTO: 8.6 K/UL — HIGH (ref 1.8–7.4)
NEUTROPHILS NFR BLD AUTO: 86.9 % — HIGH (ref 43–77)
NITRITE UR-MCNC: NEGATIVE — SIGNIFICANT CHANGE UP
NRBC # BLD: 0 /100 WBCS — SIGNIFICANT CHANGE UP (ref 0–0)
PCO2 BLDV: 28 MMHG — LOW (ref 39–42)
PCO2 BLDV: 32 MMHG — LOW (ref 39–42)
PH BLDV: 7.13 — LOW (ref 7.32–7.43)
PH BLDV: 7.5 — HIGH (ref 7.32–7.43)
PH UR: 5.5 — SIGNIFICANT CHANGE UP (ref 5–8)
PHOSPHATE SERPL-MCNC: 0.9 MG/DL — CRITICAL LOW (ref 2.5–4.5)
PHOSPHATE SERPL-MCNC: 1 MG/DL — CRITICAL LOW (ref 2.5–4.5)
PHOSPHATE SERPL-MCNC: 1.5 MG/DL — LOW (ref 2.5–4.5)
PLATELET # BLD AUTO: 231 K/UL — SIGNIFICANT CHANGE UP (ref 150–400)
PLATELET # BLD AUTO: 263 K/UL — SIGNIFICANT CHANGE UP (ref 150–400)
PLATELET # BLD AUTO: 364 K/UL — SIGNIFICANT CHANGE UP (ref 150–400)
PO2 BLDV: 44 MMHG — SIGNIFICANT CHANGE UP (ref 25–45)
PO2 BLDV: 81 MMHG — HIGH (ref 25–45)
POTASSIUM SERPL-MCNC: 2.8 MMOL/L — CRITICAL LOW (ref 3.5–5.3)
POTASSIUM SERPL-MCNC: 3.2 MMOL/L — LOW (ref 3.5–5.3)
POTASSIUM SERPL-MCNC: 3.3 MMOL/L — LOW (ref 3.5–5.3)
POTASSIUM SERPL-MCNC: 5.2 MMOL/L — SIGNIFICANT CHANGE UP (ref 3.5–5.3)
POTASSIUM SERPL-SCNC: 2.8 MMOL/L — CRITICAL LOW (ref 3.5–5.3)
POTASSIUM SERPL-SCNC: 3.2 MMOL/L — LOW (ref 3.5–5.3)
POTASSIUM SERPL-SCNC: 3.3 MMOL/L — LOW (ref 3.5–5.3)
POTASSIUM SERPL-SCNC: 5.2 MMOL/L — SIGNIFICANT CHANGE UP (ref 3.5–5.3)
PROT SERPL-MCNC: 6.5 G/DL — SIGNIFICANT CHANGE UP (ref 6–8.3)
PROT SERPL-MCNC: 6.6 G/DL — SIGNIFICANT CHANGE UP (ref 6–8.3)
PROT SERPL-MCNC: 7.8 G/DL — SIGNIFICANT CHANGE UP (ref 6–8.3)
PROT SERPL-MCNC: 9.8 G/DL — HIGH (ref 6–8.3)
PROT UR-MCNC: 100 MG/DL
RBC # BLD: 3.55 M/UL — LOW (ref 3.8–5.2)
RBC # BLD: 3.94 M/UL — SIGNIFICANT CHANGE UP (ref 3.8–5.2)
RBC # BLD: 4.97 M/UL — SIGNIFICANT CHANGE UP (ref 3.8–5.2)
RBC # FLD: 14.8 % — HIGH (ref 10.3–14.5)
RBC # FLD: 15.1 % — HIGH (ref 10.3–14.5)
RBC # FLD: 15.3 % — HIGH (ref 10.3–14.5)
RBC CASTS # UR COMP ASSIST: 3 /HPF — SIGNIFICANT CHANGE UP (ref 0–4)
SAO2 % BLDV: 65.5 % — LOW (ref 67–88)
SAO2 % BLDV: 97.5 % — HIGH (ref 67–88)
SODIUM SERPL-SCNC: 136 MMOL/L — SIGNIFICANT CHANGE UP (ref 135–145)
SODIUM SERPL-SCNC: 137 MMOL/L — SIGNIFICANT CHANGE UP (ref 135–145)
SODIUM SERPL-SCNC: 137 MMOL/L — SIGNIFICANT CHANGE UP (ref 135–145)
SODIUM SERPL-SCNC: 138 MMOL/L — SIGNIFICANT CHANGE UP (ref 135–145)
SP GR SPEC: 1.02 — SIGNIFICANT CHANGE UP (ref 1–1.03)
TIBC SERPL-MCNC: 346 UG/DL — SIGNIFICANT CHANGE UP (ref 220–430)
TROPONIN I, HIGH SENSITIVITY RESULT: 11.3 NG/L — SIGNIFICANT CHANGE UP
TROPONIN I, HIGH SENSITIVITY RESULT: 9.9 NG/L — SIGNIFICANT CHANGE UP
TSH SERPL-MCNC: 0.29 UIU/ML — LOW (ref 0.36–3.74)
UIBC SERPL-MCNC: 278 UG/DL — SIGNIFICANT CHANGE UP (ref 110–370)
UROBILINOGEN FLD QL: 1 MG/DL — SIGNIFICANT CHANGE UP (ref 0.2–1)
VIT B12 SERPL-MCNC: 923 PG/ML — SIGNIFICANT CHANGE UP (ref 232–1245)
WBC # BLD: 13.42 K/UL — HIGH (ref 3.8–10.5)
WBC # BLD: 9.24 K/UL — SIGNIFICANT CHANGE UP (ref 3.8–10.5)
WBC # BLD: 9.9 K/UL — SIGNIFICANT CHANGE UP (ref 3.8–10.5)
WBC # FLD AUTO: 13.42 K/UL — HIGH (ref 3.8–10.5)
WBC # FLD AUTO: 9.24 K/UL — SIGNIFICANT CHANGE UP (ref 3.8–10.5)
WBC # FLD AUTO: 9.9 K/UL — SIGNIFICANT CHANGE UP (ref 3.8–10.5)
WBC UR QL: 0 /HPF — SIGNIFICANT CHANGE UP (ref 0–5)

## 2024-09-20 PROCEDURE — 76700 US EXAM ABDOM COMPLETE: CPT | Mod: 26

## 2024-09-20 PROCEDURE — 93010 ELECTROCARDIOGRAM REPORT: CPT

## 2024-09-20 PROCEDURE — 99291 CRITICAL CARE FIRST HOUR: CPT

## 2024-09-20 PROCEDURE — 93306 TTE W/DOPPLER COMPLETE: CPT | Mod: 26

## 2024-09-20 PROCEDURE — 99223 1ST HOSP IP/OBS HIGH 75: CPT

## 2024-09-20 PROCEDURE — 99053 MED SERV 10PM-8AM 24 HR FAC: CPT

## 2024-09-20 RX ORDER — SODIUM CHLORIDE IRRIG SOLUTION 0.9 %
1000 SOLUTION, IRRIGATION IRRIGATION ONCE
Refills: 0 | Status: COMPLETED | OUTPATIENT
Start: 2024-09-20 | End: 2024-09-20

## 2024-09-20 RX ORDER — MAGNESIUM SULFATE 500 MG/ML
2 VIAL (ML) INJECTION ONCE
Refills: 0 | Status: COMPLETED | OUTPATIENT
Start: 2024-09-20 | End: 2024-09-20

## 2024-09-20 RX ORDER — CHLORHEXIDINE GLUCONATE ORAL RINSE 1.2 MG/ML
1 SOLUTION DENTAL
Refills: 0 | Status: DISCONTINUED | OUTPATIENT
Start: 2024-09-20 | End: 2024-09-22

## 2024-09-20 RX ORDER — SODIUM BICARBONATE 650 MG
0.4 TABLET ORAL
Qty: 150 | Refills: 0 | Status: DISCONTINUED | OUTPATIENT
Start: 2024-09-20 | End: 2024-09-20

## 2024-09-20 RX ORDER — POTASSIUM PHOSPHATE, MONOBASIC POTASSIUM PHOSPHATE, DIBASIC 224; 236 MG/ML; MG/ML
30 INJECTION, SOLUTION, CONCENTRATE INTRAVENOUS ONCE
Refills: 0 | Status: COMPLETED | OUTPATIENT
Start: 2024-09-20 | End: 2024-09-20

## 2024-09-20 RX ORDER — PANTOPRAZOLE SODIUM 40 MG/1
40 TABLET, DELAYED RELEASE ORAL DAILY
Refills: 0 | Status: DISCONTINUED | OUTPATIENT
Start: 2024-09-20 | End: 2024-09-22

## 2024-09-20 RX ORDER — ONDANSETRON HCL/PF 4 MG/2 ML
4 VIAL (ML) INJECTION ONCE
Refills: 0 | Status: DISCONTINUED | OUTPATIENT
Start: 2024-09-20 | End: 2024-09-22

## 2024-09-20 RX ORDER — SODIUM CHLORIDE IRRIG SOLUTION 0.9 %
1000 SOLUTION, IRRIGATION IRRIGATION
Refills: 0 | Status: COMPLETED | OUTPATIENT
Start: 2024-09-20 | End: 2024-09-20

## 2024-09-20 RX ADMIN — POTASSIUM PHOSPHATE, MONOBASIC POTASSIUM PHOSPHATE, DIBASIC 83.33 MILLIMOLE(S): 224; 236 INJECTION, SOLUTION, CONCENTRATE INTRAVENOUS at 16:14

## 2024-09-20 RX ADMIN — Medication 150 MEQ/KG/HR: at 14:31

## 2024-09-20 RX ADMIN — Medication 100 MILLILITER(S): at 22:27

## 2024-09-20 RX ADMIN — Medication 40 MILLIEQUIVALENT(S): at 22:02

## 2024-09-20 RX ADMIN — PANTOPRAZOLE SODIUM 40 MILLIGRAM(S): 40 TABLET, DELAYED RELEASE ORAL at 11:23

## 2024-09-20 RX ADMIN — Medication 1000 MILLILITER(S): at 06:00

## 2024-09-20 RX ADMIN — Medication 40 MILLIEQUIVALENT(S): at 16:15

## 2024-09-20 RX ADMIN — Medication 150 MEQ/KG/HR: at 07:41

## 2024-09-20 RX ADMIN — Medication 25 GRAM(S): at 16:14

## 2024-09-20 RX ADMIN — Medication 25 GRAM(S): at 22:02

## 2024-09-20 RX ADMIN — POTASSIUM PHOSPHATE, MONOBASIC POTASSIUM PHOSPHATE, DIBASIC 83.33 MILLIMOLE(S): 224; 236 INJECTION, SOLUTION, CONCENTRATE INTRAVENOUS at 22:27

## 2024-09-20 NOTE — H&P ADULT - HISTORY OF PRESENT ILLNESS
35 y/o F with no PMH, + marijuana user presented to the ED today for vomiting after fasting x 6 days. Patient reports she typically fasts for seven days every 3-4 months and she does a 3 days fast once monthly. When she fasts she does not eat any food and only drinks water. Patient reports she does this not for weight management but rather to "reset" her body and as a cleanse Patient feels her energy is better focused on her school work when she fasts because she feels the energy her body uses to digest is now able to be used to focus on school work. 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 Patient denies any alcohol use or tobacco use. She reports was doing well until overnight started vomiting and this am had "streaks" of blood in vomiting Has been having large BM's daily and this MA had a dark BM. Denies abdominal pain, diarrhea, and fever.   34 year old vegan female with no PMH, + marijuana user presented to the ED today for vomiting after fasting x 6 days.   Patient reports she typically fasts for seven days every 3-4 months and she does a 3 days fast once monthly. When she fasts she does not eat any food and only drinks water. Patient reports she does this not for weight management but rather to "reset" her body and as a cleanse Patient feels her energy is better focused on her school work when she fasts because she feels the energy her body uses to digest is now able to be used to focus on school work. 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 Patient denies any alcohol use or tobacco use. She reports was doing well until overnight started vomiting and this am had "streaks" of blood in vomiting Has been having large BM's daily and this MA had a dark BM. Denies abdominal pain, diarrhea, and fever.    Patient refused CT Scan in ED

## 2024-09-20 NOTE — H&P ADULT - ASSESSMENT
35 y/o F admitted to ICU for further management of:    ## severe dehydration  ## metabolic acidosis   ## starvation ketosis  ## GI bleed?       PLAN:  -maintain normal sleep/ wake cycle  - hemodynamically stable, continue cardiac monitoring  - diffuse T wave inversions noted on EKG, will rpt and send troponins  - cardiology consult pending  - echo pending, pt wit h no c/o chest pain   -  NPO with ice chips   - GI consult, may need EGD to assess for suspected Mallor cavazos tear vs ulcer?   - PPI  -  CT abdomen pending, pt refused  - will obtain abdominal US to eval for cause of elevated T bili  - hgb stable no signs of active GI bleeding with some streaks of blood in stool this AM  - trend CBC and electrolytes  - follow up iron panel, pt follows vegan diet  - follow up TSH  - monitor and maintain urine output > 0.5cc/kg/hr  - pH 7.13, continue bicarb gtt, repeat labs at 1400    Dispo- ICU  Plan of care [facility] with patient and [facility]  at [facility]  Patient seen and evaluated with Dr Hansen who agrees with above stated plan of care  35 y/o F admitted to ICU for further management of:    ## severe dehydration  ## metabolic acidosis    ## starvation ketosis  ## Possible GI bleed, possible Julisa Kamaljit tear  ## Abnormal EKG , TWI       PLAN:  -maintain normal sleep/ wake cycle  - hemodynamically stable, continue cardiac monitoring  - diffuse T wave inversions noted on EKG, will rpt and send troponins,   - cardiology consult pending  - echo pending, pt wit h no c/o chest pain   -  NPO with ice chips   - GI consult, may need EGD to assess for suspected Mallor cavazos tear vs ulcer? check stool occult blood  - PPI BID  -  CT abdomen ordered, patient refused, agreed to US abd, as above, may need MRI,   - will obtain abdominal US to eval for cause of elevated T bili  - hgb stable no signs of active GI bleeding with some streaks of blood in stool this AM  - trend CBC and electrolytes  - follow up iron panel, pt follows vegan diet  - follow up TSH  - monitor and maintain urine output > 0.5cc/kg/hr  - pH 7.13, continue bicarb gtt, repeat labs at 1400    Dispo- ICU  Plan of care discussed with patient and parents [facility] at [facility]  Patient seen and evaluated with Dr Hansen who agrees with above stated plan of care

## 2024-09-20 NOTE — ED PROVIDER NOTE - ATTENDING CONTRIBUTION TO CARE
34-year-old female no past medical history presenting to the ED with nonbilious emesis for a day.  There been some blood streaks in her last emesis episodes.  Patient is on a cleanse where she has not had any solid food for 6 days and is only drinking water.  She feels overall generalized weak and fatigued.  Has done this before.  Last year she ended up having to be hospitalized because of it.    Vitals: I have reviewed the patients vital signs  General: nontoxic appearing  HEENT: Atraumatic, normocephalic, airway patent  Eyes: EOMI, tracking appropriately  Neck: no tracheal deviation  Chest/Lungs: no trauma, symmetric chest rise, speaking in complete sentences,  no resp distress  Heart: skin and extremities well perfused, regular rate and rhythm  Neuro: A+Ox3, appears non focal  MSK: no deformities  Skin: no cyanosis, no jaundice   Psych:  Normal mood and affect  Abdomen: Soft nontender nondistended    34-year-old female presenting with nausea vomiting after being on a water cleanse for 6 days.  The patient likely has some significant electrolyte disturbances.  Will get laboratory studies and rehydrate with LR rather than NS.  The blood that she sees in her vomit is secondary most likely to a Julisa-Valdes and not a GI bleed.  Will treat with antiemetics.  Patient's final disposition will be related to the results of her laboratory studies as well as her ability to tolerate p.o.    Patient's laboratory studies demonstrate a significant anion gap with a bicarb of 12.  VBG will be sent.  With this type of anion gap and that bicarb I do believe the patient will likely need admission for further resuscitation    Upon my evaluation, this patient had a high probability of imminent or life-threatening deterioration due to Anion gap acidosis and low bicarb, which required my direct attention, intervention, and personal management.  The patient has a  medical condition that impairs one or more vital organ systems.  Frequent personal assessment and adjustment of medical interventions was performed.      I have personally provided 40 minutes of critical care time exclusive of time spent on separately billable procedures. Time includes review of laboratory data, radiology results, discussion with consultants, patient and family; monitoring for potential decompensation, as well as time spent retrieving data and reviewing the chart and documenting the visit. Interventions were performed as documented above.

## 2024-09-20 NOTE — CONSULT NOTE ADULT - ASSESSMENT
33 y/o F with no PMH, + marijuana user presented to the ED today for vomiting after fasting x 6 days. Patient reports she typically fasts for seven days every 3-4 months and she does a 3 days fast once monthly. When she fasts she does not eat any food and only drinks water. Patient reports she does this not for weight management but rather to "reset" her body and as a cleanse Patient feels her energy is better focused on her school work when she fasts because she feels the energy her body uses to digest is now able to be used to focus on school work. 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 Patient denies any alcohol use or tobacco use. She reports was doing well until overnight started vomiting and this am had "streaks" of blood in vomiting Has been having large BM's daily and this MA had a dark BM. Denies abdominal pain, diarrhea, and fever.   (20 Sep 2024 09:38)    GI consulted for "bloody emesis", patient seen and examined. She reports vomiting began on Tuesday, appeared to have a film. She notes with repeated vomiting she saw some specs of blood. No overt bleeding, or clots reported. Small BM yesterday, notes as dark brown, ? tarry like. No abdominal pain on exam. Denies odynophagia. Denies ETOH or tobacco use. Reports occasional marijuana use. No EGD in the past. She notes not being able to tolerated anything by mouth up until this morning, she could tolerate some ice chips.    35 y/o F with no PMHx, + marijuana user presented to the ED today for vomiting after fasting x 6 days.   GI consulted for "bloody emesis", described by patient as specks of blood in vomitus. Having dark brown BMs, ? tarry like.  Now denies abdominal pain.

## 2024-09-20 NOTE — ED PROVIDER NOTE - OBJECTIVE STATEMENT
35 y/o female with no pmhx presents to the ED with vomiting blood x 1 day. Pt states that she has been fasting for 6 days and has only been drinking water. She has not had a meal in 6 days. Pt also reports vomiting around 5-10 times and noticing streaks of blood in her vomit. Pt also admits to previous hospitalization last year for fasting and only ingesting water. Associated fatigue, nausea, darker colored stools x 1 day. No provoking or alleviating factors. Pt denies headache, fever, cough, SOB.

## 2024-09-20 NOTE — H&P ADULT - NS ATTEND AMEND GEN_ALL_CORE FT
patient seen and examined  denies abd pain  slight increase in bili , US as above  advance as diet  repeat labs at 2 pm  check stool occult blood   d/w gi can advance diet to clears  will admit to ICU for severe metabolic acidosis  suspect will improve.

## 2024-09-20 NOTE — CONSULT NOTE ADULT - PROBLEM SELECTOR RECOMMENDATION 2
Monitor LFTs  Abd US STAT- r/o choledocholithiases Monitor liver panel. Suspect secondary to prolonged fasting but will r/o choledocholithiasis. Imaging requested STAT.

## 2024-09-20 NOTE — CONSULT NOTE ADULT - PROBLEM SELECTOR RECOMMENDATION 3
Patient reports repeated prolonged fasts with large volume of water intake for the purpose of health.  Consider evaluation for eating disorder while inpatient.

## 2024-09-20 NOTE — H&P ADULT - NSHPPHYSICALEXAM_GEN_ALL_CORE
PE:   GEN: Awake, alert, interactive, NAD, non-toxic appearing.   HEAD: Atraumatic  EYES: Sclera white, conjunctiva pink, PERRLA  NOSE: Patent, no epistaxis  MOUTH/THROAT: Patent, moist MM, no Hoarse voice  CARDIAC: Reg rate and rhythm, S1,S2, no murmur/rub/gallop. Strong central and peripheral pulses, Brisk cap refill, no evident pedal edema.   RESP: No distress noted. L/S clear = Bilat without accessory muscle use, wheeze, rhonchi, rales.   ABD: soft, supple, non-tender, no guarding. BS x 4, normoactive.   NEURO: AOx3, CN II-XII grossly intact without focal deficit.   MSK: Moving all extremities with no apparent deformities.   SKIN: Warm, dry, normal color, without apparent rashes. Vital Signs Last 24 Hrs  T(C): 36.5 (20 Sep 2024 08:40), Max: 36.5 (20 Sep 2024 08:40)  T(F): 97.7 (20 Sep 2024 08:40), Max: 97.7 (20 Sep 2024 08:40)  HR: 94 (20 Sep 2024 10:00) (80 - 126)  BP: 115/77 (20 Sep 2024 10:00) (105/78 - 129/90)  BP(mean): 90 (20 Sep 2024 10:00) (88 - 96)  RR: 13 (20 Sep 2024 10:00) (11 - 18)  SpO2: 100% (20 Sep 2024 10:00) (98% - 100%)    Parameters below as of 20 Sep 2024 08:40  Patient On (Oxygen Delivery Method): room air      PE:   GEN: Awake, alert, interactive, NAD, non-toxic appearing.   HEAD: Atraumatic  EYES: Sclera white, conjunctiva pink, PERRLA  NOSE: Patent, no epistaxis  MOUTH/THROAT: Patent, moist MM, no Hoarse voice  CARDIAC: Reg rate and rhythm, S1,S2, no murmur/rub/gallop. Strong central and peripheral pulses, Brisk cap refill, no evident pedal edema.   RESP: No distress noted. L/S clear = Bilat without accessory muscle use, wheeze, rhonchi, rales.   ABD: soft, supple, non-tender, no guarding. BS x 4, normoactive.   NEURO: AOx3, CN II-XII grossly intact without focal deficit.   MSK: Moving all extremities with no apparent deformities.   SKIN: Warm, dry, normal color, without apparent rashes.

## 2024-09-20 NOTE — CONSULT NOTE ADULT - PROBLEM SELECTOR RECOMMENDATION 9
H/H normal  Would hold off on EGD at this time  Advance diet to clear liquid  Antiemetics PRN Hb normal  Would hold off on EGD at this time  Advance diet to clear liquid  Antiemetics PRN

## 2024-09-20 NOTE — CONSULT NOTE ADULT - SUBJECTIVE AND OBJECTIVE BOX
INTERVAL HPI/OVERNIGHT EVENTS:  HPI:  35 y/o F with no PMH, + marijuana user presented to the ED today for vomiting after fasting x 6 days. Patient reports she typically fasts for seven days every 3-4 months and she does a 3 days fast once monthly. When she fasts she does not eat any food and only drinks water. Patient reports she does this not for weight management but rather to "reset" her body and as a cleanse Patient feels her energy is better focused on her school work when she fasts because she feels the energy her body uses to digest is now able to be used to focus on school work. 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 Patient denies any alcohol use or tobacco use. She reports was doing well until overnight started vomiting and this am had "streaks" of blood in vomiting Has been having large BM's daily and this MA had a dark BM. Denies abdominal pain, diarrhea, and fever.   (20 Sep 2024 09:38)    GI consulted for "bloody emesis", patient seen and examined. She reports vomiting began on Tuesday, appeared to have a film. She notes with repeated vomiting she saw some specs of blood. No overt bleeding, or clots reported. Small BM yesterday, notes as dark brown, ? tarry like. No abdominal pain on exam. Denies odynophagia. Denies ETOH or tobacco use. Reports occasional marijuana use. No EGD in the past. She notes not being able to tolerated anything by mouth up until this morning, she could tolerate some ice chips.     MEDICATIONS  (STANDING):  chlorhexidine 2% Cloths 1 Application(s) Topical <User Schedule>  ondansetron Injectable 4 milliGRAM(s) IV Push once  pantoprazole  Injectable 40 milliGRAM(s) IV Push daily  sodium bicarbonate  Infusion 0.397 mEq/kG/Hr (150 mL/Hr) IV Continuous <Continuous>    MEDICATIONS  (PRN):      Allergies    shellfish (Other; Rash; Hives)  No Known Drug Allergies    Intolerances        PAST MEDICAL & SURGICAL HISTORY:  Ectopic pregnancy without intrauterine pregnancy, unspecified location      Miscarriage       history          REVIEW OF SYSTEMS: negative unless indicated in HPI      PHYSICAL EXAM:   Vital Signs:  Vital Signs Last 24 Hrs  T(C): 36.5 (20 Sep 2024 08:40), Max: 36.5 (20 Sep 2024 08:40)  T(F): 97.7 (20 Sep 2024 08:40), Max: 97.7 (20 Sep 2024 08:40)  HR: 94 (20 Sep 2024 10:00) (80 - 126)  BP: 115/77 (20 Sep 2024 10:00) (105/78 - 129/90)  BP(mean): 90 (20 Sep 2024 10:00) (88 - 96)  RR: 13 (20 Sep 2024 10:00) (11 - 18)  SpO2: 100% (20 Sep 2024 10:00) (98% - 100%)    Parameters below as of 20 Sep 2024 08:40  Patient On (Oxygen Delivery Method): room air      Daily Height in cm: 154.94 (20 Sep 2024 08:40)    Daily I&O's Summary    20 Sep 2024 07:01  -  20 Sep 2024 11:11  --------------------------------------------------------  IN: 450 mL / OUT: 275 mL / NET: 175 mL        GENERAL:  Appears stated age,  HEENT:  NC/AT,  conjunctivae clear and pink,  CHEST:  Full & symmetric excursion, no increased effort, breath sounds clear  HEART:  Regular rhythm, S1, S2, no murmur  ABDOMEN:  Soft, non-tender, non-distended, normoactive bowel sounds  EXTEREMITIES:  no edema  SKIN:  No rash/warm/dry  NEURO:  Alert, oriented,      LABS:                        14.9   9.90  )-----------( 364      ( 20 Sep 2024 05:52 )             45.3     09-20    137  |  99  |  12  ----------------------------<  132[H]  5.2   |  12[L]  |  1.14    Ca    10.3      20 Sep 2024 05:52    TPro  9.8[H]  /  Alb  5.2[H]  /  TBili  1.7[H]  /  DBili  x   /  AST  45[H]  /  ALT  26  /  AlkPhos  153[H]  09-20      Urinalysis Basic - ( 20 Sep 2024 08:11 )    Color: Yellow / Appearance: Clear / S.017 / pH: x  Gluc: x / Ketone: >=160 mg/dL  / Bili: Negative / Urobili: 1.0 mg/dL   Blood: x / Protein: 100 mg/dL / Nitrite: Negative   Leuk Esterase: Negative / RBC: 3 /HPF / WBC 0 /HPF   Sq Epi: x / Non Sq Epi: x / Bacteria: Few /HPF      amylase   lipaseLipase: 15 U/L ( @ 05:52)    RADIOLOGY & ADDITIONAL TESTS:   GI Consult    35 y/o F with no PMH, + marijuana user presented to the ED today for vomiting after fasting x 6 days. Patient reports she typically fasts for seven days every 3-4 months and she does a 3 days fast once monthly. When she fasts she does not eat any food and only drinks water. Patient reports she does this not for weight management but rather to "reset" her body and as a cleanse Patient feels her energy is better focused on her school work when she fasts because she feels the energy her body uses to digest is now able to be used to focus on school work. 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 Patient denies any alcohol use or tobacco use. She reports was doing well until overnight started vomiting and this am had "streaks" of blood in vomiting Has been having large BM's daily and this MA had a dark BM. Denies abdominal pain, diarrhea, and fever.   (20 Sep 2024 09:38)    GI consulted for "bloody emesis", patient seen and examined. She reports vomiting began on Tuesday, appeared to have a film. She notes with repeated vomiting she saw some specks of blood. No overt bleeding, or clots reported. Small BM yesterday, notes as dark brown, ? tarry like. No abdominal pain on exam. Denies odynophagia. Denies ETOH or tobacco use. Reports occasional marijuana use. No EGD in the past. She notes not being able to tolerated anything by mouth up until this morning, she could tolerate some ice chips.     MEDICATIONS  (STANDING):  chlorhexidine 2% Cloths 1 Application(s) Topical <User Schedule>  ondansetron Injectable 4 milliGRAM(s) IV Push once  pantoprazole  Injectable 40 milliGRAM(s) IV Push daily  sodium bicarbonate  Infusion 0.397 mEq/kG/Hr (150 mL/Hr) IV Continuous <Continuous>    MEDICATIONS  (PRN):      Allergies    shellfish (Other; Rash; Hives)  No Known Drug Allergies    Intolerances        PAST MEDICAL & SURGICAL HISTORY:  Ectopic pregnancy without intrauterine pregnancy, unspecified location      Miscarriage       history      Social History:  No t/a      FAMILY HISTORY:  NC        PHYSICAL EXAM:   Vital Signs:  Vital Signs Last 24 Hrs  T(C): 36.5 (20 Sep 2024 08:40), Max: 36.5 (20 Sep 2024 08:40)  T(F): 97.7 (20 Sep 2024 08:40), Max: 97.7 (20 Sep 2024 08:40)  HR: 94 (20 Sep 2024 10:00) (80 - 126)  BP: 115/77 (20 Sep 2024 10:00) (105/78 - 129/90)  BP(mean): 90 (20 Sep 2024 10:00) (88 - 96)  RR: 13 (20 Sep 2024 10:00) (11 - 18)  SpO2: 100% (20 Sep 2024 10:00) (98% - 100%)    Parameters below as of 20 Sep 2024 08:40  Patient On (Oxygen Delivery Method): room air      Daily Height in cm: 154.94 (20 Sep 2024 08:40)    Daily I&O's Summary    20 Sep 2024 07:01  -  20 Sep 2024 11:11  --------------------------------------------------------  IN: 450 mL / OUT: 275 mL / NET: 175 mL        GENERAL:  Appears stated age, NAD  HEENT:  NC/AT,  conjunctivae clear, sclerae anicteric  CHEST:  Full & symmetric excursion, vclear  HEART:  Regular rhythm, S1, S2, no murmur  ABDOMEN:  Soft, non-tender, non-distended, normoactive bowel sounds  EXTREMITIES:  no edema  SKIN:  No rash or jaundice  NEURO:  Alert, oriented.          LABS:                        14.9   9.90  )-----------( 364      ( 20 Sep 2024 05:52 )             45.3     09-20    137  |  99  |  12  ----------------------------<  132[H]  5.2   |  12[L]  |  1.14    Ca    10.3      20 Sep 2024 05:52    TPro  9.8[H]  /  Alb  5.2[H]  /  TBili  1.7[H]  /  DBili  x   /  AST  45[H]  /  ALT  26  /  AlkPhos  153[H]  09-20      Urinalysis Basic - ( 20 Sep 2024 08:11 )    Color: Yellow / Appearance: Clear / S.017 / pH: x  Gluc: x / Ketone: >=160 mg/dL  / Bili: Negative / Urobili: 1.0 mg/dL   Blood: x / Protein: 100 mg/dL / Nitrite: Negative   Leuk Esterase: Negative / RBC: 3 /HPF / WBC 0 /HPF   Sq Epi: x / Non Sq Epi: x / Bacteria: Few /HPF      Lipase: 15 U/L ( @ 05:52)

## 2024-09-20 NOTE — PATIENT PROFILE ADULT - NSPROPTRIGHTNOTIFY_GEN_A_NUR
[Obesity] : obesity [Negative] : Psychiatric [FreeTextEntry3] : per HPI [FreeTextEntry8] : per HPI [FreeTextEntry9] : per HPI declines

## 2024-09-20 NOTE — CONSULT NOTE ADULT - NS ATTEND AMEND GEN_ALL_CORE FT
Patient seen and examined at the bedside. Agree with the assessment and plan of CHARLENE Anthony.  Patient denies pain, vomiting at present.  Reviewed labs - abnormalities likely secondary to prolonged fasting. I discussed with her the risk of prolonged fasting and excessive water intake that could result in severe illness and even death from hyponatremia or other electrolyte abnormalities. She verbalized understanding.  Likely orthorexia nervosa - as above should consider inpatient evaluation for eating disorder.

## 2024-09-20 NOTE — PATIENT PROFILE ADULT - DO YOU FEEL UNSAFE AT HOME, WORK, OR SCHOOL?
I had sent her a message asking for sx's she was having with the yeast infection    ----- Message from [place] Maunie sent at 10/13/2021  3:23 PM EDT -----  Regarding: RE: Non-Urgent Medical Question  Contact: [phone]  Itching, burning in vagina-symptoms of my yeast infection. Got results of bone density test. Can you tell me what they mean. So much gobbelty gook to me. Basically, is there anything to worry about and/ or anything to do? Thanks. I have a yeast infection. Can you please call in a prescription for Fluconazole- actually can you request 2 so I can have one in my travel bag please.      Update on gut issues. Off the Creon and Colestipol for almost a month as instructed by Dr Latasha Tee. No adverse affects. Yea! Will return to [facility] for a follow up but as of now I havent felt this well in years.  Knock on wood it continues.      Hope you and yours are well
Rx's sent 10-14-21. Responded about Dexa results in a separate [employer] message to pt.
no

## 2024-09-20 NOTE — ED ADULT NURSE NOTE - OBJECTIVE STATEMENT
Patient A&Ox4 came in  from home c/o nausea, vomiting, weakness, and dark stool after fasting for 6 days. Patient took in no solids only water. Pt also reports light streaks of blood when vomiting. Patient A&Ox4 came in  from home c/o nausea, vomiting, weakness, and dark stool after fasting for 6 days. Patient took in no solids only water. Pt also reports light streaks of blood when vomiting. Patient frequently does this throughout the year and this is her second hospitalization for the same thing.

## 2024-09-20 NOTE — ED PROVIDER NOTE - CLINICAL SUMMARY MEDICAL DECISION MAKING FREE TEXT BOX
34-year-old female presenting with nausea vomiting after being on a water cleanse for 6 days.  The patient likely has some significant electrolyte disturbances.  Will get laboratory studies and rehydrate with LR rather than NS.  The blood that she sees in her vomit is secondary most likely to a Julisa-Valdes and not a GI bleed.  Will treat with antiemetics.  Patient's final disposition will be related to the results of her laboratory studies as well as her ability to tolerate p.o.    Patient's laboratory studies demonstrate a significant anion gap with a bicarb of 12.  VBG will be sent.  With this type of anion gap and that bicarb I do believe the patient will likely need admission for further resuscitation 34-year-old female presenting with nausea vomiting after being on a water cleanse for 6 days.  The patient likely has some significant electrolyte disturbances.  Will get laboratory studies and rehydrate with LR rather than NS.  The blood that she sees in her vomit is secondary most likely to a Julisa-Valdes and not a GI bleed.  Will treat with antiemetics.  Patient's final disposition will be related to the results of her laboratory studies as well as her ability to tolerate p.o.    Patient's laboratory studies demonstrate a significant anion gap with a bicarb of 12.  VBG will be sent.  With this type of anion gap and that bicarb I do believe the patient will likely need admission for further resuscitation  Dr. Dale–patient admitted to critical care unit endorsed by Dr. Whitman patient's blood hemolyzed nurses requested to order ketones and beta-hCG 34-year-old female presenting with nausea vomiting after being on a water cleanse for 6 days.  The patient likely has some significant electrolyte disturbances.  Will get laboratory studies and rehydrate with LR rather than NS.  The blood that she sees in her vomit is secondary most likely to a Julisa-Valdes and not a GI bleed.  Will treat with antiemetics.  Patient's final disposition will be related to the results of her laboratory studies as well as her ability to tolerate p.o.    Patient's laboratory studies demonstrate a significant anion gap with a bicarb of 12.  VBG will be sent.  With this type of anion gap and that bicarb I do believe the patient will likely need admission for further resuscitation  Dr. Dale–patient admitted to critical care unit endorsed by Dr. Whitman patient's blood hemolyzed nurses requested to order ketones and beta-hCG  8 AM patient looks well abdomen is soft nontender patient declines CT abdomen and pelvis patient's urine pregnancy test is negative

## 2024-09-21 LAB
ALBUMIN SERPL ELPH-MCNC: 3 G/DL — LOW (ref 3.3–5)
ALP SERPL-CCNC: 96 U/L — SIGNIFICANT CHANGE UP (ref 40–120)
ALT FLD-CCNC: 17 U/L — SIGNIFICANT CHANGE UP (ref 10–45)
ANION GAP SERPL CALC-SCNC: 5 MMOL/L — SIGNIFICANT CHANGE UP (ref 5–17)
ANION GAP SERPL CALC-SCNC: 7 MMOL/L — SIGNIFICANT CHANGE UP (ref 5–17)
AST SERPL-CCNC: 30 U/L — SIGNIFICANT CHANGE UP (ref 10–40)
BILIRUB SERPL-MCNC: 0.8 MG/DL — SIGNIFICANT CHANGE UP (ref 0.2–1.2)
BUN SERPL-MCNC: 5 MG/DL — LOW (ref 7–23)
BUN SERPL-MCNC: 5 MG/DL — LOW (ref 7–23)
CALCIUM SERPL-MCNC: 8 MG/DL — LOW (ref 8.4–10.5)
CALCIUM SERPL-MCNC: 8 MG/DL — LOW (ref 8.4–10.5)
CHLORIDE SERPL-SCNC: 104 MMOL/L — SIGNIFICANT CHANGE UP (ref 96–108)
CHLORIDE SERPL-SCNC: 106 MMOL/L — SIGNIFICANT CHANGE UP (ref 96–108)
CO2 SERPL-SCNC: 27 MMOL/L — SIGNIFICANT CHANGE UP (ref 22–31)
CO2 SERPL-SCNC: 30 MMOL/L — SIGNIFICANT CHANGE UP (ref 22–31)
CREAT SERPL-MCNC: 0.58 MG/DL — SIGNIFICANT CHANGE UP (ref 0.5–1.3)
CREAT SERPL-MCNC: 0.62 MG/DL — SIGNIFICANT CHANGE UP (ref 0.5–1.3)
CULTURE RESULTS: SIGNIFICANT CHANGE UP
EGFR: 119 ML/MIN/1.73M2 — SIGNIFICANT CHANGE UP
EGFR: 121 ML/MIN/1.73M2 — SIGNIFICANT CHANGE UP
GLUCOSE SERPL-MCNC: 110 MG/DL — HIGH (ref 70–99)
GLUCOSE SERPL-MCNC: 112 MG/DL — HIGH (ref 70–99)
HCT VFR BLD CALC: 27.5 % — LOW (ref 34.5–45)
HCT VFR BLD CALC: 28.4 % — LOW (ref 34.5–45)
HGB BLD-MCNC: 9.4 G/DL — LOW (ref 11.5–15.5)
HGB BLD-MCNC: 9.6 G/DL — LOW (ref 11.5–15.5)
MAGNESIUM SERPL-MCNC: 1.9 MG/DL — SIGNIFICANT CHANGE UP (ref 1.6–2.6)
MCHC RBC-ENTMCNC: 29.9 PG — SIGNIFICANT CHANGE UP (ref 27–34)
MCHC RBC-ENTMCNC: 30 PG — SIGNIFICANT CHANGE UP (ref 27–34)
MCHC RBC-ENTMCNC: 33.8 GM/DL — SIGNIFICANT CHANGE UP (ref 32–36)
MCHC RBC-ENTMCNC: 34.2 GM/DL — SIGNIFICANT CHANGE UP (ref 32–36)
MCV RBC AUTO: 87.6 FL — SIGNIFICANT CHANGE UP (ref 80–100)
MCV RBC AUTO: 88.8 FL — SIGNIFICANT CHANGE UP (ref 80–100)
NRBC # BLD: 0 /100 WBCS — SIGNIFICANT CHANGE UP (ref 0–0)
NRBC # BLD: 0 /100 WBCS — SIGNIFICANT CHANGE UP (ref 0–0)
OB PNL STL: NEGATIVE — SIGNIFICANT CHANGE UP
PHOSPHATE SERPL-MCNC: 3 MG/DL — SIGNIFICANT CHANGE UP (ref 2.5–4.5)
PLATELET # BLD AUTO: 168 K/UL — SIGNIFICANT CHANGE UP (ref 150–400)
PLATELET # BLD AUTO: 189 K/UL — SIGNIFICANT CHANGE UP (ref 150–400)
POTASSIUM SERPL-MCNC: 3.8 MMOL/L — SIGNIFICANT CHANGE UP (ref 3.5–5.3)
POTASSIUM SERPL-MCNC: 4 MMOL/L — SIGNIFICANT CHANGE UP (ref 3.5–5.3)
POTASSIUM SERPL-SCNC: 3.8 MMOL/L — SIGNIFICANT CHANGE UP (ref 3.5–5.3)
POTASSIUM SERPL-SCNC: 4 MMOL/L — SIGNIFICANT CHANGE UP (ref 3.5–5.3)
PROT SERPL-MCNC: 5.7 G/DL — LOW (ref 6–8.3)
RBC # BLD: 3.14 M/UL — LOW (ref 3.8–5.2)
RBC # BLD: 3.2 M/UL — LOW (ref 3.8–5.2)
RBC # FLD: 15.2 % — HIGH (ref 10.3–14.5)
RBC # FLD: 15.5 % — HIGH (ref 10.3–14.5)
SODIUM SERPL-SCNC: 139 MMOL/L — SIGNIFICANT CHANGE UP (ref 135–145)
SODIUM SERPL-SCNC: 140 MMOL/L — SIGNIFICANT CHANGE UP (ref 135–145)
SPECIMEN SOURCE: SIGNIFICANT CHANGE UP
WBC # BLD: 4.31 K/UL — SIGNIFICANT CHANGE UP (ref 3.8–10.5)
WBC # BLD: 5.87 K/UL — SIGNIFICANT CHANGE UP (ref 3.8–10.5)
WBC # FLD AUTO: 4.31 K/UL — SIGNIFICANT CHANGE UP (ref 3.8–10.5)
WBC # FLD AUTO: 5.87 K/UL — SIGNIFICANT CHANGE UP (ref 3.8–10.5)

## 2024-09-21 PROCEDURE — 93010 ELECTROCARDIOGRAM REPORT: CPT

## 2024-09-21 PROCEDURE — 99222 1ST HOSP IP/OBS MODERATE 55: CPT

## 2024-09-21 RX ORDER — SODIUM PHOSPHATE IN D5W 15MMOL/250
30 PLASTIC BAG, INJECTION (ML) INTRAVENOUS ONCE
Refills: 0 | Status: COMPLETED | OUTPATIENT
Start: 2024-09-21 | End: 2024-09-21

## 2024-09-21 RX ADMIN — Medication 40 MILLIEQUIVALENT(S): at 03:27

## 2024-09-21 RX ADMIN — Medication 85 MILLIMOLE(S): at 01:18

## 2024-09-21 RX ADMIN — PANTOPRAZOLE SODIUM 40 MILLIGRAM(S): 40 TABLET, DELAYED RELEASE ORAL at 12:19

## 2024-09-21 RX ADMIN — Medication 40 MILLIEQUIVALENT(S): at 08:37

## 2024-09-21 RX ADMIN — CHLORHEXIDINE GLUCONATE ORAL RINSE 1 APPLICATION(S): 1.2 SOLUTION DENTAL at 05:39

## 2024-09-21 RX ADMIN — Medication 40 MILLIEQUIVALENT(S): at 01:18

## 2024-09-21 NOTE — CONSULT NOTE ADULT - SUBJECTIVE AND OBJECTIVE BOX
JOVANI MARIE  [id number]      HPI:    Jovani Marie is a 34 year old woman with no significant past medical history who presented after prolonged fasting with resultant weakness, inability to take in PO and vomiting.    ALLERGIES:  shellfish (Other; Rash; Hives)  No Known Drug Allergies      CURRENT MEDICATIONS:  chlorhexidine 2% Cloths 1 Application(s) Topical <User Schedule>  ondansetron Injectable 4 milliGRAM(s) IV Push once  pantoprazole  Injectable 40 milliGRAM(s) IV Push daily      ROS:  All 10 systems reviewed and positives noted in HPI    OBJECTIVE:    VITAL SIGNS:  Vital Signs Last 24 Hrs  T(C): 36.6 (21 Sep 2024 12:50), Max: 36.8 (21 Sep 2024 05:00)  T(F): 97.8 (21 Sep 2024 12:50), Max: 98.2 (21 Sep 2024 05:00)  HR: 98 (21 Sep 2024 12:50) (63 - 111)  BP: 118/80 (21 Sep 2024 12:50) (86/45 - 133/118)  BP(mean): 75 (21 Sep 2024 09:00) (57 - 125)  RR: 18 (21 Sep 2024 12:50) (11 - 23)  SpO2: 100% (21 Sep 2024 12:50) (97% - 100%)    Parameters below as of 21 Sep 2024 12:50  Patient On (Oxygen Delivery Method): room air      PHYSICAL EXAM:  General: no distress  HEENT: sclera anicteric  Neck: supple  CVS: JVP ~ 7 cm H20, RRR, s1, s2, no murmurs/rubs/gallops  Chest: unlabored respirations, clear to auscultation b/l  Abdomen: non-distended  Extremities: no lower extremity edema b/l  Neuro: awake, alert & oriented  Psych: normal affect      LABS:                        9.6    4.31  )-----------( 168      ( 21 Sep 2024 12:22 )             28.4     09-21    139  |  104  |  5[L]  ----------------------------<  112[H]  3.8   |  30  |  0.58    Ca    8.0[L]      21 Sep 2024 06:00  Phos  3.0     09-21  Mg     1.9     09-21    TPro  5.7[L]  /  Alb  3.0[L]  /  TBili  0.8  /  DBili  x   /  AST  30  /  ALT  17  /  AlkPhos  96  09-21      ECG (10/2/22): sinus rhythm, inferior T wave inversions, LVH   ECG (9/20/24): sinus rhythm, inferior and anterior T wave inversions   ECG (9/21/24): sinus rhythm, nonspecific ST abnormalities     TTE (9/20/24):  LVEF 60-65%  Normal RV size and function

## 2024-09-21 NOTE — PROGRESS NOTE ADULT - ASSESSMENT
35 y/o F admitted to ICU for further management of:    ## severe dehydration  ## metabolic acidosis    ## starvation ketosis  ## Possible GI bleed, possible Julisa Kamaljit tear  ## Abnormal EKG , TWI       PLAN:  -maintain normal sleep/ wake cycle  - hemodynamically stable, continue cardiac monitoring  - diffuse T wave inversions noted on EKG, troponins negative, cardio following T wave inversion significantly less on todays EKG  - cardiology following  - echo reviewed  -  tolerating PO diet, no BMs overnight  - follow up stool for OB if has BM  - GI following, no intervention at this time   - PPI BID  - hgb WNL but downtrending  no signs of active GI bleeding , continue to trend   - trend CBC and electrolytes, replace lytes as needed  - acidosis improved, now of bicarb drip  - monitor and maintain urine output > 0.5cc/kg/hr    Dispo- tele     Plan of care discussed with patient and parents who are at bedside  Patient seen and evaluated with Dr Hansen who agrees with above stated plan of care    35 y/o F admitted to ICU for further management of:    ## severe dehydration  ## metabolic acidosis    ## starvation ketosis  ## Possible GI bleed, possible Julisa Kamaljit tear  ## Abnormal EKG , TWI       PLAN:  -maintain normal sleep/ wake cycle  - hemodynamically stable, continue cardiac monitoring  - diffuse T wave inversions noted on EKG, troponins negative, cardio following T wave inversion significantly less on todays EKG    - cardiology following  - echo reviewed  -  tolerating PO diet, no BMs overnight  - follow up stool for OB if has BM  - GI following, no intervention at this time   - PPI BID  - hgb WNL but downtrending  no signs of active GI bleeding , continue to trend   - trend CBC and electrolytes, replace lytes as needed  - acidosis improved, now of bicarb drip  - monitor and maintain urine output > 0.5cc/kg/hr    Dispo- tele     Plan of care discussed with patient and parents who are at bedside  Patient seen and evaluated with Dr Hansen who agrees with above stated plan of care

## 2024-09-21 NOTE — CONSULT NOTE ADULT - ASSESSMENT
Assessment:  Jovani Gaviria is a 34 year old woman with no significant past medical history who presented after prolonged fasting with resultant weakness, inability to take in PO and vomiting, found to have severe dehydration with ketosis and metabolic acidosis.     Cardiology consulted due to abnormal ECG. The patient reports being active, denies exertional angina or dyspnea. ECG on admission consistent with sinus rhythm, inferior and anterior T wave inversions, patient with history of inferior T wave inversions back in 2022 ECG. Most recent ECG with T wave inversions now mostly resolved and replaced by nonspecific ST abnormality. Troponins negative x 2, no signs of an acute coronary syndrome. Echo report with no structural heart disease, there is normal LV and RV systolic function. Telemetry consistent with sinus rhythm, no arrhythmias. Labs with severe hypokalemia and hypomagnesemia on presentation, now improving.     Recommendations:  [] Abnormal ECG: Repeat ECGs improving with now nonspecific ST abnormalities present. ECG abnormalities appear to be secondary to severe electrolyte derangements. There are no signs of an acute coronary syndrome in this otherwise healthy 34 year old patient. There are no signs of structural heart disease on echo. Keep electrolytes repleted K ~ 4 and Mg ~ 2. Discussed with patient and her mother that patient should follow up with PCP and if ECG abnormalities return in the setting of normal electrolytes and non-ill state then recommend outpatient cardiology follow-up, they agree with this plan.   [] Orthorexia nervosa: Follow up GI    We will sign off, please re-consult if needed.    Mahnaz Manrique MD  Cardiology

## 2024-09-21 NOTE — PROGRESS NOTE ADULT - ASSESSMENT
34 year old vegan female with no PMH, + marijuana user presented to the ED today for vomiting after fasting x 6 days.   Patient reports she typically fasts for seven days every 3-4 months and she does a 3 days fast once monthly. When she fasts she does not eat any food and only drinks water. Patient reports she does this not for weight management but rather to "reset" her body and as a cleanse Patient feels her energy is better focused on her school work when she fasts because she feels the energy her body uses to digest is now able to be used to focus on school work. 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 Patient denies any alcohol use or tobacco use. She reports was doing well until overnight started vomiting and this am had "streaks" of blood in vomiting Has been having large BM's daily and this MA had a dark BM. Denies abdominal pain, diarrhea, and fever.    Patient refused CT Scan in ED  (20 Sep 2024 09:38)    GI Note: Patient seen and examined a bedside. no overnight events reported. Patient states she had tolerated multiple meals since yesterday. Denies N/V/D, abdominal pain or discomforts.    Abdominal US  IMPRESSION:    No evidence of acute cholecystitis or biliary dilatation.  4 mm solitary gallbladder polyp. Consider 12 month follow-up ultrasound   to ensure stability.  Hyperechoic hepatic lesions possible hemangiomas. Recommend MRI for   further characterization to exclude other pathology

## 2024-09-21 NOTE — PROGRESS NOTE ADULT - SUBJECTIVE AND OBJECTIVE BOX
INTERVAL HPI/OVERNIGHT EVENTS:  HPI:  34 year old vegan female with no PMH, + marijuana user presented to the ED today for vomiting after fasting x 6 days.   Patient reports she typically fasts for seven days every 3-4 months and she does a 3 days fast once monthly. When she fasts she does not eat any food and only drinks water. Patient reports she does this not for weight management but rather to "reset" her body and as a cleanse Patient feels her energy is better focused on her school work when she fasts because she feels the energy her body uses to digest is now able to be used to focus on school work. 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 Patient denies any alcohol use or tobacco use. She reports was doing well until overnight started vomiting and this am had "streaks" of blood in vomiting Has been having large BM's daily and this MA had a dark BM. Denies abdominal pain, diarrhea, and fever.    Patient refused CT Scan in ED  (20 Sep 2024 09:38)    GI Note: Patient seen and examined a bedside. no overnight events reported. Patient states she had tolerated multiple meals since yesterday. Denies N/V/D, abdominal pain or discomforts.      MEDICATIONS  (STANDING):  chlorhexidine 2% Cloths 1 Application(s) Topical <User Schedule>  ondansetron Injectable 4 milliGRAM(s) IV Push once  pantoprazole  Injectable 40 milliGRAM(s) IV Push daily    MEDICATIONS  (PRN):      Allergies    shellfish (Other; Rash; Hives)  No Known Drug Allergies    Intolerances        PAST MEDICAL & SURGICAL HISTORY:  Ectopic pregnancy without intrauterine pregnancy, unspecified location      Miscarriage       history          REVIEW OF SYSTEMS    Negative unless indicated in HPI    PHYSICAL EXAM:   Vital Signs:  Vital Signs Last 24 Hrs  T(C): 36.8 (21 Sep 2024 05:00), Max: 37.2 (20 Sep 2024 16:00)  T(F): 98.2 (21 Sep 2024 05:00), Max: 99 (20 Sep 2024 16:00)  HR: 65 (21 Sep 2024 06:00) (65 - 115)  BP: 95/62 (21 Sep 2024 06:00) (86/45 - 133/118)  BP(mean): 73 (21 Sep 2024 06:00) (57 - 125)  RR: 18 (21 Sep 2024 06:00) (11 - 23)  SpO2: 100% (21 Sep 2024 06:00) (97% - 100%)    Parameters below as of 21 Sep 2024 00:00  Patient On (Oxygen Delivery Method): room air      Daily     Daily Weight in k.4 (21 Sep 2024 05:00)I&O's Summary    20 Sep 2024 07:01  -  21 Sep 2024 07:00  --------------------------------------------------------  IN: 2700 mL / OUT: 575 mL / NET: 2125 mL        GENERAL:  Appears stated age, well-groomed, well-nourished, no distress  HEENT:  NC/AT,  conjunctivae clear and pink, no thyromegaly, nodules, adenopathy, no JVD, sclera -anicteric  CHEST:  Full & symmetric excursion, no increased effort, breath sounds clear  HEART:  Regular rhythm, S1, S2, no murmur/rub/S3/S4, no abdominal bruit, no edema  ABDOMEN:  Soft, non-tender, non-distended, normoactive bowel sounds,  no masses ,no hepato-splenomegaly, no signs of chronic liver disease  EXTEREMITIES:  no cyanosis, clubbing or edema  SKIN:  No rash/erythema/ecchymoses/petechiae/wounds/abscess/warm/dry  NEURO:  Alert, oriented, no asterixis, no tremor, no encephalopathy      LABS:                        9.4    5.87  )-----------( 189      ( 21 Sep 2024 06:00 )             27.5     09-    139  |  104  |  5[L]  ----------------------------<  112[H]  3.8   |  30  |  0.58    Ca    8.0[L]      21 Sep 2024 06:00  Phos  3.0     09-  Mg     1.9     -    TPro  5.7[L]  /  Alb  3.0[L]  /  TBili  0.8  /  DBili  x   /  AST  30  /  ALT  17  /  AlkPhos  96        Urinalysis Basic - ( 21 Sep 2024 06:00 )    Color: x / Appearance: x / SG: x / pH: x  Gluc: 112 mg/dL / Ketone: x  / Bili: x / Urobili: x   Blood: x / Protein: x / Nitrite: x   Leuk Esterase: x / RBC: x / WBC x   Sq Epi: x / Non Sq Epi: x / Bacteria: x      amylase   lipaseLipase: 19 U/L ( @ 14:00)  Lipase: 15 U/L ( @ 05:52)    RADIOLOGY & ADDITIONAL TESTS:  < from: US Abdomen Complete (US Abdomen Complete .) (24 @ 11:08) >  ACC: [id number] EXAM:  US ABDOMEN COMPLETE   ORDERED BY: KATHLEEN LINDER     PROCEDURE DATE:  2024          INTERPRETATION:  CLINICAL INFORMATION: Elevated LFTs    COMPARISON: None available.    TECHNIQUE: Sonography of the abdomen.    FINDINGS:  Liver: There is a 1.6 cm hyperechoic lesion adjacent to the gallbladder   and a 1.3 cm similar hyperechoic lesion right hepatic lobe. These may   represent hemangiomas. Recommend the dedicated hepatic MR for   confirmation and to exclude the other etiologies.  Bile ducts: Normal caliber. Common bile duct measures 3 mm.  Gallbladder: No gallstones wall thickening or pericholecystic fluid.   There is a 4 mm solitary gallbladder polyp..  Pancreas: Visualized portions are within normal limits.  Spleen: 9.0 cm. Within normal limits.  Right kidney: 9.2 cm. No hydronephrosis.  Left kidney: 9.3 cm. No hydronephrosis.  Ascites: None.  Aorta and IVC: Visualized portions are within normal limits.    IMPRESSION:    No evidence of acute cholecystitis or biliary dilatation.  4 mm solitary gallbladder polyp. Consider 12 month follow-up ultrasound   to ensure stability.  Hyperechoic hepatic lesions possible hemangiomas. Recommend MRI for   further characterization to exclude other pathology    --- End of Report ---            KEENAN GATICA MD; Attending Radiologist  This document has been electronically signed. Sep 20 2024 11:35AM    < end of copied text >

## 2024-09-21 NOTE — PROGRESS NOTE ADULT - PROBLEM SELECTOR PLAN 1
Hb normal  Advance diet as tolerated  Antiemetics PRN.  No GI procedural interventions indicated at this time

## 2024-09-21 NOTE — PROGRESS NOTE ADULT - NS ATTEND AMEND GEN_ALL_CORE FT
Diagnosis Line Normal sinus rhythm with sinus arrhythmia Nonspecific T wave abnormality   Artifact is present.   Abnormal ECG    H/H Trend  09-21-24 @ 12:22   -  9.6[L] / 28.4[L]  09-21-24 @ 06:00   -  9.4[L] / 27.5[L]  09-20-24 @ 20:00   -  10.7[L] / 30.3[L]  09-20-24 @ 14:00   -  11.9 / 34.1[L]  09-20-24 @ 05:52   -  14.9 / 45.3[H]     Stool Occult Blood  09-21-24 @ 14:47   -   Negative    Trend Thyroid  TSH / Free T3 - T3 / Free T4 - T4 : 09-20-24 @ 14:00   -  0.292[L] / -- - -- / -- - --    < from: TTE Echo Complete w/o Contrast w/ Doppler (09.20.24 @ 11:24) >      Summary:   1. Left ventricular ejection fraction, by visual estimation, is 60 to 65%.   2. Normal global left ventricular systolic function.   3. Normal left ventricular size and wall thicknesses, with normal systolic and diastolic function.   4. Normal right ventricular size and function.   5. The left atrium is normal in size.   6. The right atrium is normal in size.   7. There is a significant pericardial fat pad present.   8. There is no evidence of pericardial effusion.   9. No evidence of mitral valve regurgitation.  10. Structurally normal mitral valve, with normal leaflet excursion.  11. Normal trileaflet aortic valve with normal opening.  12. Structurally normal pulmonic valve, with normal leaflet excursion.  13. The main pulmonary artery is normal in size.    [id number] Pj Kitchen MD,[facility] , Electronically signed on   9/20/2024 at 1:02:16 PM      < end of copied text >        patient seen and examined  labs improved  downtrending h/h possible dehydration vs menses, but no bm  low thyroid, will need repeat TSH as out patient, unsure if sick euthyroid  Out patient gi f/u for MRI   TWI continues, will need cardio f/u, Echo normal

## 2024-09-21 NOTE — PROGRESS NOTE ADULT - SUBJECTIVE AND OBJECTIVE BOX
Follow-up Critical Care Progress Note  Chief Complaint : Acidosis      Stable overnight. Tolerating PO diet. No complaints. No BM overnight.        Allergies :shellfish (Other; Rash; Hives)  No Known Drug Allergies      PAST MEDICAL & SURGICAL HISTORY:  Ectopic pregnancy without intrauterine pregnancy, unspecified location  Miscarriage   history        Medications:  MEDICATIONS  (STANDING):  chlorhexidine 2% Cloths 1 Application(s) Topical <User Schedule>  ondansetron Injectable 4 milliGRAM(s) IV Push once  pantoprazole  Injectable 40 milliGRAM(s) IV Push daily       GI Medications  pantoprazole  Injectable 40 milliGRAM(s) IV Push daily, 24 @ 09:32, Routine        Trend Cardiac Enzymes  24 @ 14:00  KCF-SAZOV-ZQQBZ-CPKMM/Trop I - -- - --  - --  -  --  /  9.9  24 @ 11:00  QTA-QLFQQ-GHQNW-CPKMM/Trop I - -- - --  - --  -  --  /  11.3      WBC Trend  24 @ 06:00   -  5.87  24 @ 20:00   -  9.24  24 @ 14:00   -  13.42[H]  24 @ 05:52   -  9.90    H/H Trend  24 @ 06:00   -   9.4[L]/ 27.5[L]  24 @ 20:00   -   10.7[L]/ 30.3[L]  24 @ 14:00   -   11.9/ 34.1[L]  24 @ 05:52   -   14.9/ 45.3[H]      Platelet Trend  24 @ 06:00   -  189  24 @ 20:00   -  231  24 @ 14:00   -  263  24 @ 05:52   -  364    Trend Sodium  24 @ 06:00   -  139  24 @ 20:45   -  138  24 @ 20:00   -  136  24 @ 14:00   -  137  24 @ 05:52   -  137    Trend Potassium  24 @ 06:00   -  3.8  24 @ 20:45   -  2.8[LL]  24 @ 20:00   -  3.3[L]  24 @ 14:00   -  3.2[L]  24 @ 05:52   -  5.2    Trend Bun/Cr  24 @ 06:00  BUN/CR -  5[L] / 0.58  24 @ 20:45  BUN/CR -  6[L] / 0.79  24 @ 20:00  BUN/CR -  6[L] / 0.82  24 @ 14:00  BUN/CR -  8 / 0.93  24 @ 05:52  BUN/CR -  12 / 1.14      Trend AST/ALT/ALK Phos/Bili  24 @ 06:00   30/96/0.8  24 @ 20:45   /107/1.6[H]  24 @ 20:00   /106/1.6[H]24 @ 14:00   20/123[H]/1.4[H]  24 @ 05:52   45[H]/26/153[H]/1.7[H]  10-02-22 @ 02:13   36/24/134[H]/1.2      Amylase / Lipase Trend  24 @ 14:00   -   -- / 19  24 @ 05:52   -   -- / 15[L]      Albumin Trend  24 @ 06:00   -   3.0[L]  24 @ 20:45   -   3.6  24 @ 20:00   -   3.5  24 @ 14:00   -   4.2  24 @ 05:52   -   5.2[H]  10-02-22 @ 02:13   -   5.3[H]      PTT - PT - INR Trend    Glucose Trend  24 @ 06:00   -  112[H] -- --  24 @ 20:45   -  160[H] -- --  24 @ 20:00   -  174[H] -- --  24 @ 14:00   -  152[H] -- --  24 @ 05:52   -  132[H] -- --        LABS:                        9.4    5.87  )-----------( 189      ( 21 Sep 2024 06:00 )             27.5         139  |  104  |  5[L]  ----------------------------<  112[H]  3.8   |  30  |  0.58    Ca    8.0[L]      21 Sep 2024 06:00  Phos  3.0       Mg     1.9         TPro  5.7[L]  /  Alb  3.0[L]  /  TBili  0.8  /  DBili  x   /  AST  30  /  ALT  17  /  AlkPhos  96          Urinalysis Basic - ( 21 Sep 2024 06:00 )    Color: x / Appearance: x / SG: x / pH: x  Gluc: 112 mg/dL / Ketone: x  / Bili: x / Urobili: x   Blood: x / Protein: x / Nitrite: x   Leuk Esterase: x / RBC: x / WBC x   Sq Epi: x / Non Sq Epi: x / Bacteria: x          RADIOLOGY  ECHO: < from: TTE Echo Complete w/o Contrast w/ Doppler (24 @ 11:24) >   1. Left ventricular ejection fraction, by visual estimation, is 60 to   65%.   2. Normal global left ventricular systolic function.   3. Normal left ventricular size and wall thicknesses, with normal   systolic and diastolic function.   4. Normal right ventricular size and function.   5. The left atrium is normal in size.   6. The right atrium is normal in size.   7. There is a significant pericardial fat pad present.   8. There is no evidence of pericardial effusion.   9. No evidence of mitral valve regurgitation.  10. Structurally normal mitral valve, with normal leaflet excursion.  11. Normal trileaflet aortic valve with normal opening.  12. Structurally normal pulmonic valve, with normal leaflet excursion.  13. The main pulmonary artery is normal in size.    < end of copied text >    Abdominal US  IMPRESSION:    No evidence of acute cholecystitis or biliary dilatation.  4 mm solitary gallbladder polyp. Consider 12 month follow-up ultrasound   to ensure stability.  Hyperechoic hepatic lesions possible hemangiomas. Recommend MRI for   further characterization to exclude other pathology          VITALS:  T(C): 36.8 (24 @ 05:00), Max: 37.2 (24 @ 16:00)  T(F): 98.2 (24 @ 05:00), Max: 99 (24 @ 16:00)  HR: 65 (24 @ 06:00) (65 - 115)  BP: 95/62 (24 @ 06:00) (86/45 - 133/118)  BP(mean): 73 (24 @ 06:00) (57 - 125)  RR: 18 (24 @ 06:00) (11 - 23)  SpO2: 100% (24 @ 06:00) (97% - 100%)    Ins and Outs     24 @ 07:01  -  24 @ 07:00  --------------------------------------------------------  IN: 2700 mL / OUT: 575 mL / NET: 2125 mL        Height (cm): 154.9 (24 @ 08:40)  Weight (kg): 59 (24 @ 08:40)  BMI (kg/m2): 24.6 (24 @ 08:40)        I&O's Detail    20 Sep 2024 07:01  -  21 Sep 2024 07:00  --------------------------------------------------------  IN:    IV PiggyBack: 50 mL    IV PiggyBack: 500 mL    Lactated Ringers w/ Additives: 200 mL    Sodium Bicarbonate: 1950 mL  Total IN: 2700 mL    OUT:    Voided (mL): 575 mL  Total OUT: 575 mL    Total NET: 2125 mL        Physical Examination:  GENERAL:               Alert, Oriented, No acute distress.    HEENT:                    Pupils equal, reactive to light.  Symmetric. Moist MM  PULM:                     Bilateral air entry, Clear to auscultation bilaterally,   ABD:                        Soft, nondistended, nontender, normoactive bowel sounds,   EXT:                         No edema, nontender, No Cyanosis or Clubbing   Vascular:                Warm Extremities, Normal Capillary refill, Normal Distal Pulses  SKIN:                       Warm and well perfused, no rashes noted.   NEURO:                  Alert, oriented, interactive, nonfocal, follows commands             Follow-up Critical Care Progress Note  Chief Complaint : Acidosis      Stable overnight. Tolerating PO diet. No complaints. No BM overnight.        Allergies :shellfish (Other; Rash; Hives)  No Known Drug Allergies      PAST MEDICAL & SURGICAL HISTORY:  Ectopic pregnancy without intrauterine pregnancy, unspecified location  Miscarriage   history        Medications:  MEDICATIONS  (STANDING):  chlorhexidine 2% Cloths 1 Application(s) Topical <User Schedule>  ondansetron Injectable 4 milliGRAM(s) IV Push once  pantoprazole  Injectable 40 milliGRAM(s) IV Push daily       GI Medications  pantoprazole  Injectable 40 milliGRAM(s) IV Push daily, 24 @ 09:32, Routine        Trend Cardiac Enzymes  24 @ 14:00  OFJ-RTGFN-PSOTX-CPKMM/Trop I - -- - --  - --  -  --  /  9.9  24 @ 11:00  SBY-SJVQK-ZAFJE-CPKMM/Trop I - -- - --  - --  -  --  /  11.3      WBC Trend  24 @ 06:00   -  5.87  24 @ 20:00   -  9.24  24 @ 14:00   -  13.42[H]  24 @ 05:52   -  9.90    H/H Trend  24 @ 06:00   -   9.4[L]/ 27.5[L]  24 @ 20:00   -   10.7[L]/ 30.3[L]  24 @ 14:00   -   11.9/ 34.1[L]  24 @ 05:52   -   14.9/ 45.3[H]      Platelet Trend  24 @ 06:00   -  189  24 @ 20:00   -  231  24 @ 14:00   -  263  24 @ 05:52   -  364    Trend Sodium  24 @ 06:00   -  139  24 @ 20:45   -  138  24 @ 20:00   -  136  24 @ 14:00   -  137  24 @ 05:52   -  137    Trend Potassium  24 @ 06:00   -  3.8  24 @ 20:45   -  2.8[LL]  24 @ 20:00   -  3.3[L]  24 @ 14:00   -  3.2[L]  24 @ 05:52   -  5.2    Trend Bun/Cr  24 @ 06:00  BUN/CR -  5[L] / 0.58  24 @ 20:45  BUN/CR -  6[L] / 0.79  24 @ 20:00  BUN/CR -  6[L] / 0.82  24 @ 14:00  BUN/CR -  8 / 0.93  24 @ 05:52  BUN/CR -  12 / 1.14      Trend AST/ALT/ALK Phos/Bili  24 @ 06:00   30/96/0.8  24 @ 20:45   /107/1.6[H]  24 @ 20:00   /106/1.6[H]24 @ 14:00   20/123[H]/1.4[H]  24 @ 05:52   45[H]/26/153[H]/1.7[H]  10-02-22 @ 02:13   36/24/134[H]/1.2      Amylase / Lipase Trend  24 @ 14:00   -   -- / 19  24 @ 05:52   -   -- / 15[L]      Albumin Trend  24 @ 06:00   -   3.0[L]  24 @ 20:45   -   3.6  24 @ 20:00   -   3.5  24 @ 14:00   -   4.2  24 @ 05:52   -   5.2[H]  10-02-22 @ 02:13   -   5.3[H]      PTT - PT - INR Trend    Glucose Trend  24 @ 06:00   -  112[H] -- --  24 @ 20:45   -  160[H] -- --  24 @ 20:00   -  174[H] -- --  24 @ 14:00   -  152[H] -- --  24 @ 05:52   -  132[H] -- --        LABS:                        9.4    5.87  )-----------( 189      ( 21 Sep 2024 06:00 )             27.5         139  |  104  |  5[L]  ----------------------------<  112[H]  3.8   |  30  |  0.58    Ca    8.0[L]      21 Sep 2024 06:00  Phos  3.0       Mg     1.9         TPro  5.7[L]  /  Alb  3.0[L]  /  TBili  0.8  /  DBili  x   /  AST  30  /  ALT  17  /  AlkPhos  96          RADIOLOGY  ECHO: < from: TTE Echo Complete w/o Contrast w/ Doppler (24 @ 11:24) >   1. Left ventricular ejection fraction, by visual estimation, is 60 to   65%.   2. Normal global left ventricular systolic function.   3. Normal left ventricular size and wall thicknesses, with normal   systolic and diastolic function.   4. Normal right ventricular size and function.   5. The left atrium is normal in size.   6. The right atrium is normal in size.   7. There is a significant pericardial fat pad present.   8. There is no evidence of pericardial effusion.   9. No evidence of mitral valve regurgitation.  10. Structurally normal mitral valve, with normal leaflet excursion.  11. Normal trileaflet aortic valve with normal opening.  12. Structurally normal pulmonic valve, with normal leaflet excursion.  13. The main pulmonary artery is normal in size.    < end of copied text >    Abdominal US  IMPRESSION:    No evidence of acute cholecystitis or biliary dilatation.  4 mm solitary gallbladder polyp. Consider 12 month follow-up ultrasound   to ensure stability.  Hyperechoic hepatic lesions possible hemangiomas. Recommend MRI for   further characterization to exclude other pathology          VITALS:  T(C): 36.8 (24 @ 05:00), Max: 37.2 (24 @ 16:00)  T(F): 98.2 (24 @ 05:00), Max: 99 (24 @ 16:00)  HR: 65 (24 @ 06:00) (65 - 115)  BP: 95/62 (24 @ 06:00) (86/45 - 133/118)  BP(mean): 73 (24 @ 06:00) (57 - 125)  RR: 18 (24 @ 06:00) (11 - 23)  SpO2: 100% (24 @ 06:00) (97% - 100%)    Ins and Outs     24 @ 07:01  -  24 @ 07:00  --------------------------------------------------------  IN: 2700 mL / OUT: 575 mL / NET: 2125 mL        Height (cm): 154.9 (24 @ 08:40)  Weight (kg): 59 (24 @ 08:40)  BMI (kg/m2): 24.6 (24 @ 08:40)        I&O's Detail    20 Sep 2024 07:01  -  21 Sep 2024 07:00  --------------------------------------------------------  IN:    IV PiggyBack: 50 mL    IV PiggyBack: 500 mL    Lactated Ringers w/ Additives: 200 mL    Sodium Bicarbonate: 1950 mL  Total IN: 2700 mL    OUT:    Voided (mL): 575 mL  Total OUT: 575 mL    Total NET: 2125 mL        Physical Examination:  GENERAL:               Alert, Oriented, No acute distress.    HEENT:                    Pupils equal, reactive to light.  Symmetric. Moist MM  PULM:                     Bilateral air entry, Clear to auscultation bilaterally,   ABD:                        Soft, nondistended, nontender, normoactive bowel sounds,   EXT:                         No edema, nontender, No Cyanosis or Clubbing   Vascular:                Warm Extremities, Normal Capillary refill, Normal Distal Pulses  SKIN:                       Warm and well perfused, no rashes noted.   NEURO:                  Alert, oriented, interactive, nonfocal, follows commands

## 2024-09-21 NOTE — PROGRESS NOTE ADULT - PROBLEM SELECTOR PLAN 2
Monitor liver panel. Suspect secondary to prolonged fasting but will r/o choledocholithiasis.  LFT's normal at present  abdominal US Impression:  IMPRESSION:  No evidence of acute cholecystitis or biliary dilatation.  4 mm solitary gallbladder polyp. Consider 12 month follow-up ultrasound   to ensure stability.  Hyperechoic hepatic lesions possible hemangiomas. Recommend MRI for   further characterization to exclude other pathology- follow up outpatient

## 2024-09-22 VITALS
HEART RATE: 106 BPM | TEMPERATURE: 98 F | OXYGEN SATURATION: 98 % | RESPIRATION RATE: 17 BRPM | SYSTOLIC BLOOD PRESSURE: 128 MMHG | DIASTOLIC BLOOD PRESSURE: 83 MMHG

## 2024-09-22 LAB
ALBUMIN SERPL ELPH-MCNC: 3 G/DL — LOW (ref 3.3–5)
ALP SERPL-CCNC: 90 U/L — SIGNIFICANT CHANGE UP (ref 40–120)
ALT FLD-CCNC: 15 U/L — SIGNIFICANT CHANGE UP (ref 10–45)
ANION GAP SERPL CALC-SCNC: 4 MMOL/L — LOW (ref 5–17)
AST SERPL-CCNC: 15 U/L — SIGNIFICANT CHANGE UP (ref 10–40)
BILIRUB SERPL-MCNC: 0.4 MG/DL — SIGNIFICANT CHANGE UP (ref 0.2–1.2)
BUN SERPL-MCNC: 4 MG/DL — LOW (ref 7–23)
CALCIUM SERPL-MCNC: 8.3 MG/DL — LOW (ref 8.4–10.5)
CHLORIDE SERPL-SCNC: 108 MMOL/L — SIGNIFICANT CHANGE UP (ref 96–108)
CO2 SERPL-SCNC: 32 MMOL/L — HIGH (ref 22–31)
CREAT SERPL-MCNC: 0.5 MG/DL — SIGNIFICANT CHANGE UP (ref 0.5–1.3)
EGFR: 125 ML/MIN/1.73M2 — SIGNIFICANT CHANGE UP
GLUCOSE SERPL-MCNC: 90 MG/DL — SIGNIFICANT CHANGE UP (ref 70–99)
HCT VFR BLD CALC: 27.6 % — LOW (ref 34.5–45)
HGB BLD-MCNC: 9.2 G/DL — LOW (ref 11.5–15.5)
MAGNESIUM SERPL-MCNC: 1.8 MG/DL — SIGNIFICANT CHANGE UP (ref 1.6–2.6)
MCHC RBC-ENTMCNC: 30.2 PG — SIGNIFICANT CHANGE UP (ref 27–34)
MCHC RBC-ENTMCNC: 33.3 GM/DL — SIGNIFICANT CHANGE UP (ref 32–36)
MCV RBC AUTO: 90.5 FL — SIGNIFICANT CHANGE UP (ref 80–100)
NRBC # BLD: 0 /100 WBCS — SIGNIFICANT CHANGE UP (ref 0–0)
PHOSPHATE SERPL-MCNC: 2.6 MG/DL — SIGNIFICANT CHANGE UP (ref 2.5–4.5)
PLATELET # BLD AUTO: 148 K/UL — LOW (ref 150–400)
POTASSIUM SERPL-MCNC: 3.7 MMOL/L — SIGNIFICANT CHANGE UP (ref 3.5–5.3)
POTASSIUM SERPL-SCNC: 3.7 MMOL/L — SIGNIFICANT CHANGE UP (ref 3.5–5.3)
PROT SERPL-MCNC: 5.4 G/DL — LOW (ref 6–8.3)
RBC # BLD: 3.05 M/UL — LOW (ref 3.8–5.2)
RBC # FLD: 16.2 % — HIGH (ref 10.3–14.5)
SODIUM SERPL-SCNC: 144 MMOL/L — SIGNIFICANT CHANGE UP (ref 135–145)
WBC # BLD: 4.36 K/UL — SIGNIFICANT CHANGE UP (ref 3.8–10.5)
WBC # FLD AUTO: 4.36 K/UL — SIGNIFICANT CHANGE UP (ref 3.8–10.5)

## 2024-09-22 PROCEDURE — 84702 CHORIONIC GONADOTROPIN TEST: CPT

## 2024-09-22 PROCEDURE — 83550 IRON BINDING TEST: CPT

## 2024-09-22 PROCEDURE — 85025 COMPLETE CBC W/AUTO DIFF WBC: CPT

## 2024-09-22 PROCEDURE — 80048 BASIC METABOLIC PNL TOTAL CA: CPT

## 2024-09-22 PROCEDURE — 82248 BILIRUBIN DIRECT: CPT

## 2024-09-22 PROCEDURE — 82607 VITAMIN B-12: CPT

## 2024-09-22 PROCEDURE — 87086 URINE CULTURE/COLONY COUNT: CPT

## 2024-09-22 PROCEDURE — 82009 KETONE BODYS QUAL: CPT

## 2024-09-22 PROCEDURE — 93005 ELECTROCARDIOGRAM TRACING: CPT

## 2024-09-22 PROCEDURE — 80053 COMPREHEN METABOLIC PANEL: CPT

## 2024-09-22 PROCEDURE — 76700 US EXAM ABDOM COMPLETE: CPT

## 2024-09-22 PROCEDURE — 82272 OCCULT BLD FECES 1-3 TESTS: CPT

## 2024-09-22 PROCEDURE — 86850 RBC ANTIBODY SCREEN: CPT

## 2024-09-22 PROCEDURE — 83690 ASSAY OF LIPASE: CPT

## 2024-09-22 PROCEDURE — 82803 BLOOD GASES ANY COMBINATION: CPT

## 2024-09-22 PROCEDURE — 99291 CRITICAL CARE FIRST HOUR: CPT

## 2024-09-22 PROCEDURE — 83540 ASSAY OF IRON: CPT

## 2024-09-22 PROCEDURE — 85027 COMPLETE CBC AUTOMATED: CPT

## 2024-09-22 PROCEDURE — 84484 ASSAY OF TROPONIN QUANT: CPT

## 2024-09-22 PROCEDURE — 84443 ASSAY THYROID STIM HORMONE: CPT

## 2024-09-22 PROCEDURE — 81001 URINALYSIS AUTO W/SCOPE: CPT

## 2024-09-22 PROCEDURE — 82746 ASSAY OF FOLIC ACID SERUM: CPT

## 2024-09-22 PROCEDURE — 99239 HOSP IP/OBS DSCHRG MGMT >30: CPT

## 2024-09-22 PROCEDURE — 36415 COLL VENOUS BLD VENIPUNCTURE: CPT

## 2024-09-22 PROCEDURE — 84100 ASSAY OF PHOSPHORUS: CPT

## 2024-09-22 PROCEDURE — 86901 BLOOD TYPING SEROLOGIC RH(D): CPT

## 2024-09-22 PROCEDURE — 86900 BLOOD TYPING SEROLOGIC ABO: CPT

## 2024-09-22 PROCEDURE — 83735 ASSAY OF MAGNESIUM: CPT

## 2024-09-22 PROCEDURE — 93306 TTE W/DOPPLER COMPLETE: CPT

## 2024-09-22 NOTE — DISCHARGE NOTE PROVIDER - HOSPITAL COURSE
34 year old vegan female with no PMH, + marijuana user presented to the [facility] ED for vomiting after fasting x 6 days. In ED, pt found to have an anion gap with bicarb of 12. VBG: PH7.13. +ketosis. EKG with ischemic changes. CT A/P was recommended but pt declined. US Abdomen: +4mm gallbladder polyp and Hepatic hemangioma. ICU was consulted and admitted pt for Starvation Ketosis and metabolic acidosis. Bicarb gtt was started with significant improvement. GI was consulted for concern of GI Bleed vs. berkley cavazos tear and elevated liver function tests. GI recs: no need for EGD at this time. Recommend repeat labwork as outpatient. Elevated liver function tests may likely be due to prolonged fasting. Cardiology consulted: ECG abnormalities appear to be secondary to severe electrolyte derangements. There are no signs of an acute coronary syndrome. Recommend outpatient cardiology follow-up. Pt was downgraded to medicine floor after improvement in bicarb and anion gap and electrolyte repletion.     9/22/24. Pt seen and examined at bedside. Doing well. Pt is medically optimized for discharge at this time. Recommend f/u with outpatient cardiology for EKG abnormalities and electrolyte derangements. Recommend follow-up with PCP upon discharge.     Vital Signs Last 24 Hrs  T(C): 36.8 (22 Sep 2024 05:00), Max: 36.9 (21 Sep 2024 20:13)  T(F): 98.3 (22 Sep 2024 05:00), Max: 98.5 (21 Sep 2024 20:13)  HR: 69 (22 Sep 2024 05:00) (69 - 98)  BP: 96/61 (22 Sep 2024 05:00) (96/58 - 118/80)  BP(mean): --  RR: 17 (22 Sep 2024 05:00) (17 - 18)  SpO2: 99% (22 Sep 2024 05:00) (99% - 100%)    Parameters below as of 22 Sep 2024 05:00  Patient On (Oxygen Delivery Method): room air    T(C): 36.8 (09-22-24 @ 05:00), Max: 36.9 (09-21-24 @ 20:13)  HR: 69 (09-22-24 @ 05:00) (69 - 98)  BP: 96/61 (09-22-24 @ 05:00) (96/58 - 118/80)  RR: 17 (09-22-24 @ 05:00) (17 - 18)  SpO2: 99% (09-22-24 @ 05:00) (99% - 100%)    Physical Exam  GENERAL: NAD  HEAD:  Atraumatic, Normocephalic  EYES: EOMI, conjunctiva and sclera clear  ENT: Moist mucous membranes  NECK: Supple  CHEST/LUNG: Clear to auscultation bilaterally. Unlabored respirations  HEART: RRR. No murmurs, rubs, or gallops  ABDOMEN: Bowel sounds present; Soft, Nontender, Nondistended.  NERVOUS SYSTEM:  Alert & Oriented x4

## 2024-09-22 NOTE — DISCHARGE NOTE PROVIDER - CARE PROVIDER_API CALL
Mahnaz Manrique  Cardiovascular Disease  [address], [address]  [place] [address]  Phone: [phone]  Fax: [phone]  Follow Up Time: 1 month

## 2024-09-22 NOTE — DISCHARGE NOTE NURSING/CASE MANAGEMENT/SOCIAL WORK - PATIENT PORTAL LINK FT
You can access the FollowMyHealth Patient Portal offered by [facility] by registering at the following website: [email or web address]. By joining [employer]’s FollowMyHealth portal, you will also be able to view your health information using other applications (apps) compatible with our system.

## 2024-09-22 NOTE — DISCHARGE NOTE PROVIDER - NSDCCAREPROVSEEN_GEN_ALL_CORE_FT
Deejay, Jm Josue, Maryann Flannery, Lorene Manrique, [facility], Sapna Stephens, Dl Gabriel, Lobo Gonzalez, Kateryna Galdamez, Eliane Montero, Mir

## 2024-09-22 NOTE — DISCHARGE NOTE PROVIDER - NSFOLLOWUPCLINICS_GEN_ALL_ED_FT
[facility]  Family Medicine  [address] [place] [id number]  Phone: [phone]  Fax:

## 2024-09-22 NOTE — DISCHARGE NOTE PROVIDER - NSDCCPCAREPLAN_GEN_ALL_CORE_FT
PRINCIPAL DISCHARGE DIAGNOSIS  Diagnosis: Metabolic acidosis  Assessment and Plan of Treatment: You were found to have a high level of acid in your body and changes in your electrolytes. We suspect that this was caused by your current diet. We started you on IV sodium bicarbonate drip to normalize the acid in your body. Cardiology and Gastroenterology saw you during your hospitalization and recommend that you follow-up with a cardiologist on outpatient as well as your primary care doctor for repeat imaging and labwork.      SECONDARY DISCHARGE DIAGNOSES  Diagnosis: Gallbladder polyp  Assessment and Plan of Treatment: You were found to have a 4mm polyp on your US of the abdomen. We recommend that you follow-up with your primary care doctor and repeat the US Abdomen in one year    Diagnosis: Hepatic hemangioma  Assessment and Plan of Treatment: You were found to have a collection of vaculature called hemangioma on your US of the abdomen. These findings are typically benign. We recommend that you follow-up with your primary care doctor and repeat the US Abdomen in one year